# Patient Record
Sex: MALE | Race: WHITE | NOT HISPANIC OR LATINO | Employment: FULL TIME | URBAN - METROPOLITAN AREA
[De-identification: names, ages, dates, MRNs, and addresses within clinical notes are randomized per-mention and may not be internally consistent; named-entity substitution may affect disease eponyms.]

---

## 2024-07-02 ENCOUNTER — HOSPITAL ENCOUNTER (INPATIENT)
Facility: HOSPITAL | Age: 29
LOS: 5 days | Discharge: HOME | End: 2024-07-07
Attending: EMERGENCY MEDICINE | Admitting: NURSE PRACTITIONER
Payer: COMMERCIAL

## 2024-07-02 ENCOUNTER — APPOINTMENT (OUTPATIENT)
Dept: RADIOLOGY | Facility: HOSPITAL | Age: 29
DRG: 603 | End: 2024-07-02
Payer: COMMERCIAL

## 2024-07-02 DIAGNOSIS — Z93.3 CECOSTOMY STATUS (MULTI): ICD-10-CM

## 2024-07-02 DIAGNOSIS — R78.81 BACTEREMIA: ICD-10-CM

## 2024-07-02 DIAGNOSIS — L03.311 CELLULITIS, ABDOMINAL WALL: Primary | ICD-10-CM

## 2024-07-02 LAB
ALBUMIN SERPL BCP-MCNC: 5 G/DL (ref 3.4–5)
ALP SERPL-CCNC: 103 U/L (ref 33–120)
ALT SERPL W P-5'-P-CCNC: 24 U/L (ref 10–52)
ANION GAP SERPL CALC-SCNC: 16 MMOL/L (ref 10–20)
APTT PPP: 25 SECONDS (ref 27–38)
AST SERPL W P-5'-P-CCNC: 51 U/L (ref 9–39)
BASOPHILS # BLD MANUAL: 0 X10*3/UL (ref 0–0.1)
BASOPHILS NFR BLD MANUAL: 0 %
BILIRUB SERPL-MCNC: 0.9 MG/DL (ref 0–1.2)
BUN SERPL-MCNC: 8 MG/DL (ref 6–23)
CALCIUM SERPL-MCNC: 9.4 MG/DL (ref 8.6–10.6)
CHLORIDE SERPL-SCNC: 100 MMOL/L (ref 98–107)
CO2 SERPL-SCNC: 23 MMOL/L (ref 21–32)
CREAT SERPL-MCNC: 0.79 MG/DL (ref 0.5–1.3)
EGFRCR SERPLBLD CKD-EPI 2021: >90 ML/MIN/1.73M*2
EOSINOPHIL # BLD MANUAL: 0.04 X10*3/UL (ref 0–0.7)
EOSINOPHIL NFR BLD MANUAL: 0.9 %
ERYTHROCYTE [DISTWIDTH] IN BLOOD BY AUTOMATED COUNT: 12.4 % (ref 11.5–14.5)
GLUCOSE SERPL-MCNC: 73 MG/DL (ref 74–99)
HCT VFR BLD AUTO: 43.4 % (ref 41–52)
HGB BLD-MCNC: 15.4 G/DL (ref 13.5–17.5)
HOLD SPECIMEN: NORMAL
IMM GRANULOCYTES # BLD AUTO: 0.01 X10*3/UL (ref 0–0.7)
IMM GRANULOCYTES NFR BLD AUTO: 0.2 % (ref 0–0.9)
INR PPP: 1.1 (ref 0.9–1.1)
LACTATE SERPL-SCNC: 0.9 MMOL/L (ref 0.4–2)
LYMPHOCYTES # BLD MANUAL: 2.13 X10*3/UL (ref 1.2–4.8)
LYMPHOCYTES NFR BLD MANUAL: 48.3 %
MAGNESIUM SERPL-MCNC: 2.55 MG/DL (ref 1.6–2.4)
MCH RBC QN AUTO: 29.6 PG (ref 26–34)
MCHC RBC AUTO-ENTMCNC: 35.5 G/DL (ref 32–36)
MCV RBC AUTO: 83 FL (ref 80–100)
MONOCYTES # BLD MANUAL: 0.18 X10*3/UL (ref 0.1–1)
MONOCYTES NFR BLD MANUAL: 4.2 %
NEUTROPHILS # BLD MANUAL: 1.83 X10*3/UL (ref 1.2–7.7)
NEUTS BAND # BLD MANUAL: 1.12 X10*3/UL (ref 0–0.7)
NEUTS BAND NFR BLD MANUAL: 25.4 %
NEUTS SEG # BLD MANUAL: 0.71 X10*3/UL (ref 1.2–7)
NEUTS SEG NFR BLD MANUAL: 16.1 %
NRBC BLD-RTO: 0 /100 WBCS (ref 0–0)
PLASMA CELLS # BLD MANUAL: 0.04 X10*3/UL
PLASMA CELLS NFR BLD MANUAL: 0.9 %
PLATELET # BLD AUTO: 142 X10*3/UL (ref 150–450)
POTASSIUM SERPL-SCNC: 4 MMOL/L (ref 3.5–5.3)
POTASSIUM SERPL-SCNC: 5.8 MMOL/L (ref 3.5–5.3)
PROT SERPL-MCNC: 9 G/DL (ref 6.4–8.2)
PROTHROMBIN TIME: 12.9 SECONDS (ref 9.8–12.8)
RBC # BLD AUTO: 5.21 X10*6/UL (ref 4.5–5.9)
RBC MORPH BLD: ABNORMAL
SODIUM SERPL-SCNC: 133 MMOL/L (ref 136–145)
TOTAL CELLS COUNTED BLD: 118
VARIANT LYMPHS # BLD MANUAL: 0.18 X10*3/UL (ref 0–0.5)
VARIANT LYMPHS NFR BLD: 4.2 %
WBC # BLD AUTO: 4.4 X10*3/UL (ref 4.4–11.3)

## 2024-07-02 PROCEDURE — 2550000001 HC RX 255 CONTRASTS

## 2024-07-02 PROCEDURE — 87086 URINE CULTURE/COLONY COUNT: CPT | Performed by: EMERGENCY MEDICINE

## 2024-07-02 PROCEDURE — 74177 CT ABD & PELVIS W/CONTRAST: CPT

## 2024-07-02 PROCEDURE — 81001 URINALYSIS AUTO W/SCOPE: CPT | Performed by: EMERGENCY MEDICINE

## 2024-07-02 PROCEDURE — 83605 ASSAY OF LACTIC ACID: CPT

## 2024-07-02 PROCEDURE — 85007 BL SMEAR W/DIFF WBC COUNT: CPT | Performed by: EMERGENCY MEDICINE

## 2024-07-02 PROCEDURE — 87040 BLOOD CULTURE FOR BACTERIA: CPT | Performed by: EMERGENCY MEDICINE

## 2024-07-02 PROCEDURE — 85027 COMPLETE CBC AUTOMATED: CPT | Performed by: EMERGENCY MEDICINE

## 2024-07-02 PROCEDURE — 96366 THER/PROPH/DIAG IV INF ADDON: CPT

## 2024-07-02 PROCEDURE — 84132 ASSAY OF SERUM POTASSIUM: CPT | Performed by: EMERGENCY MEDICINE

## 2024-07-02 PROCEDURE — 2500000004 HC RX 250 GENERAL PHARMACY W/ HCPCS (ALT 636 FOR OP/ED): Performed by: EMERGENCY MEDICINE

## 2024-07-02 PROCEDURE — 36415 COLL VENOUS BLD VENIPUNCTURE: CPT | Performed by: EMERGENCY MEDICINE

## 2024-07-02 PROCEDURE — 96365 THER/PROPH/DIAG IV INF INIT: CPT

## 2024-07-02 PROCEDURE — 36415 COLL VENOUS BLD VENIPUNCTURE: CPT

## 2024-07-02 PROCEDURE — 96367 TX/PROPH/DG ADDL SEQ IV INF: CPT

## 2024-07-02 PROCEDURE — 99285 EMERGENCY DEPT VISIT HI MDM: CPT | Performed by: EMERGENCY MEDICINE

## 2024-07-02 PROCEDURE — 2500000004 HC RX 250 GENERAL PHARMACY W/ HCPCS (ALT 636 FOR OP/ED)

## 2024-07-02 PROCEDURE — 1210000001 HC SEMI-PRIVATE ROOM DAILY

## 2024-07-02 PROCEDURE — 85610 PROTHROMBIN TIME: CPT | Performed by: EMERGENCY MEDICINE

## 2024-07-02 PROCEDURE — 74177 CT ABD & PELVIS W/CONTRAST: CPT | Performed by: SURGERY

## 2024-07-02 PROCEDURE — 80053 COMPREHEN METABOLIC PANEL: CPT | Performed by: EMERGENCY MEDICINE

## 2024-07-02 PROCEDURE — 83735 ASSAY OF MAGNESIUM: CPT | Performed by: EMERGENCY MEDICINE

## 2024-07-02 PROCEDURE — 99285 EMERGENCY DEPT VISIT HI MDM: CPT | Mod: 25

## 2024-07-02 PROCEDURE — 99222 1ST HOSP IP/OBS MODERATE 55: CPT | Performed by: NURSE PRACTITIONER

## 2024-07-02 RX ORDER — VANCOMYCIN HYDROCHLORIDE 1 G/20ML
INJECTION, POWDER, LYOPHILIZED, FOR SOLUTION INTRAVENOUS DAILY PRN
Status: DISCONTINUED | OUTPATIENT
Start: 2024-07-02 | End: 2024-07-04

## 2024-07-02 RX ORDER — METHYLPHENIDATE HYDROCHLORIDE 36 MG/1
72 TABLET ORAL EVERY MORNING
COMMUNITY
Start: 2024-06-28

## 2024-07-02 RX ORDER — MORPHINE SULFATE 4 MG/ML
4 INJECTION INTRAVENOUS ONCE
Status: DISCONTINUED | OUTPATIENT
Start: 2024-07-02 | End: 2024-07-03

## 2024-07-02 RX ORDER — ACETAMINOPHEN 160 MG/5ML
650 SOLUTION ORAL EVERY 4 HOURS PRN
Status: DISCONTINUED | OUTPATIENT
Start: 2024-07-02 | End: 2024-07-07 | Stop reason: HOSPADM

## 2024-07-02 RX ORDER — POLYETHYLENE GLYCOL 3350 17 G/17G
17 POWDER, FOR SOLUTION ORAL DAILY PRN
Status: DISCONTINUED | OUTPATIENT
Start: 2024-07-02 | End: 2024-07-07 | Stop reason: HOSPADM

## 2024-07-02 RX ORDER — SERTRALINE HYDROCHLORIDE 100 MG/1
2 TABLET, FILM COATED ORAL
COMMUNITY
Start: 2024-06-19

## 2024-07-02 RX ORDER — SERTRALINE HYDROCHLORIDE 100 MG/1
200 TABLET, FILM COATED ORAL
Status: DISCONTINUED | OUTPATIENT
Start: 2024-07-03 | End: 2024-07-07 | Stop reason: HOSPADM

## 2024-07-02 RX ORDER — ACETAMINOPHEN 325 MG/1
650 TABLET ORAL EVERY 4 HOURS PRN
Status: DISCONTINUED | OUTPATIENT
Start: 2024-07-02 | End: 2024-07-07 | Stop reason: HOSPADM

## 2024-07-02 RX ORDER — METHYLPHENIDATE HYDROCHLORIDE 36 MG/1
72 TABLET ORAL EVERY MORNING
Status: DISCONTINUED | OUTPATIENT
Start: 2024-07-03 | End: 2024-07-03

## 2024-07-02 RX ORDER — ACETAMINOPHEN 650 MG/1
650 SUPPOSITORY RECTAL EVERY 4 HOURS PRN
Status: DISCONTINUED | OUTPATIENT
Start: 2024-07-02 | End: 2024-07-07 | Stop reason: HOSPADM

## 2024-07-02 RX ORDER — VANCOMYCIN HYDROCHLORIDE 1 G/200ML
1000 INJECTION, SOLUTION INTRAVENOUS ONCE
Status: COMPLETED | OUTPATIENT
Start: 2024-07-02 | End: 2024-07-02

## 2024-07-02 RX ORDER — LURASIDONE HYDROCHLORIDE 40 MG/1
40 TABLET, FILM COATED ORAL EVERY EVENING
Status: DISCONTINUED | OUTPATIENT
Start: 2024-07-03 | End: 2024-07-07 | Stop reason: HOSPADM

## 2024-07-02 RX ORDER — ACETAMINOPHEN 325 MG/1
650 TABLET ORAL ONCE
Status: COMPLETED | OUTPATIENT
Start: 2024-07-02 | End: 2024-07-02

## 2024-07-02 RX ORDER — LURASIDONE HYDROCHLORIDE 40 MG/1
40 TABLET, FILM COATED ORAL EVERY EVENING
COMMUNITY
Start: 2024-04-30

## 2024-07-02 RX ORDER — ONDANSETRON HYDROCHLORIDE 2 MG/ML
4 INJECTION, SOLUTION INTRAVENOUS ONCE
Status: DISCONTINUED | OUTPATIENT
Start: 2024-07-02 | End: 2024-07-07 | Stop reason: HOSPADM

## 2024-07-02 RX ORDER — LAMOTRIGINE 100 MG/1
100 TABLET ORAL DAILY
Status: DISCONTINUED | OUTPATIENT
Start: 2024-07-03 | End: 2024-07-03

## 2024-07-02 RX ORDER — ACETAMINOPHEN 500 MG
10 TABLET ORAL NIGHTLY PRN
Status: DISCONTINUED | OUTPATIENT
Start: 2024-07-02 | End: 2024-07-07 | Stop reason: HOSPADM

## 2024-07-02 RX ORDER — LAMOTRIGINE 100 MG/1
50 TABLET ORAL DAILY
COMMUNITY
Start: 2024-04-15

## 2024-07-02 RX ADMIN — VANCOMYCIN HYDROCHLORIDE 1000 MG: 1 INJECTION, SOLUTION INTRAVENOUS at 21:07

## 2024-07-02 RX ADMIN — SODIUM CHLORIDE, POTASSIUM CHLORIDE, SODIUM LACTATE AND CALCIUM CHLORIDE 1000 ML: 600; 310; 30; 20 INJECTION, SOLUTION INTRAVENOUS at 20:29

## 2024-07-02 RX ADMIN — IOHEXOL 80 ML: 350 INJECTION, SOLUTION INTRAVENOUS at 20:21

## 2024-07-02 RX ADMIN — PIPERACILLIN SODIUM AND TAZOBACTAM SODIUM 4.5 G: 4; .5 INJECTION, SOLUTION INTRAVENOUS at 20:10

## 2024-07-02 RX ADMIN — VANCOMYCIN HYDROCHLORIDE 1000 MG: 1 INJECTION, SOLUTION INTRAVENOUS at 21:08

## 2024-07-02 RX ADMIN — ACETAMINOPHEN 650 MG: 325 TABLET ORAL at 22:10

## 2024-07-02 ASSESSMENT — ENCOUNTER SYMPTOMS
FEVER: 1
PALPITATIONS: 0
MYALGIAS: 1
DYSURIA: 0
DIARRHEA: 0
HEMATURIA: 0
CHILLS: 1
SHORTNESS OF BREATH: 0
FLANK PAIN: 0
FREQUENCY: 0
VOMITING: 0
CONSTIPATION: 0
NAUSEA: 0
WOUND: 1
ABDOMINAL PAIN: 0

## 2024-07-02 ASSESSMENT — LIFESTYLE VARIABLES
EVER HAD A DRINK FIRST THING IN THE MORNING TO STEADY YOUR NERVES TO GET RID OF A HANGOVER: NO
EVER FELT BAD OR GUILTY ABOUT YOUR DRINKING: NO
TOTAL SCORE: 0
HAVE YOU EVER FELT YOU SHOULD CUT DOWN ON YOUR DRINKING: NO
HAVE PEOPLE ANNOYED YOU BY CRITICIZING YOUR DRINKING: NO

## 2024-07-02 ASSESSMENT — COLUMBIA-SUICIDE SEVERITY RATING SCALE - C-SSRS
2. HAVE YOU ACTUALLY HAD ANY THOUGHTS OF KILLING YOURSELF?: NO
1. IN THE PAST MONTH, HAVE YOU WISHED YOU WERE DEAD OR WISHED YOU COULD GO TO SLEEP AND NOT WAKE UP?: NO
6. HAVE YOU EVER DONE ANYTHING, STARTED TO DO ANYTHING, OR PREPARED TO DO ANYTHING TO END YOUR LIFE?: NO

## 2024-07-02 ASSESSMENT — PAIN - FUNCTIONAL ASSESSMENT: PAIN_FUNCTIONAL_ASSESSMENT: 0-10

## 2024-07-02 ASSESSMENT — PAIN SCALES - GENERAL
PAINLEVEL_OUTOF10: 5 - MODERATE PAIN
PAINLEVEL_OUTOF10: 4

## 2024-07-02 NOTE — ED TRIAGE NOTES
Patient states that he was born with spina bifida and has neurogenic bowel and bladder. Patient has a Chait cecostomy drain placed. Patient recently had an infection / abscess that he got drained. Patient was on oral clindamycin and finished coarse of antibiotics last week. Patient is now having body aches/ fever 102f at home / headache/ discharge from site. Concerned about a deeper more chronic infection. Patient had outpatient ultrasound. Has drained about 15ml of pus.  Patient is back on oral clindamycin

## 2024-07-02 NOTE — ED PROVIDER NOTES
HPI   Chief Complaint   Patient presents with    Wound Check     HPI    Pt is 28 y/o male with male with history of spina bifida posttreatment chait cecostomy drainage for the neurogenic bowel, neurogenic bladder, status post gastric sleeve surgery in 2018, s/p meningocele repair.  Patient reports that his Chait cecostomy was drained with approximately 15 ml of drainage in early June by his father given the patient's father is a trauma surgeon and this was done in his father's clinic.  Patient reports that he was placed on a course of oral clindamycin and finished his course about a week ago.  However patient has continued to feel generalized body aches, chills, subjective fever of 102, headache and increased drainage around his cecostomy site.  Patient states that he recently started another course of clindamycin and today is his second day.  Patient states after medication he feels much better.  Patient is currently not complaining of any dizziness, vision changes, neck pain, neck stiffness, chest pain, shortness of breath, nausea, vomiting, abdominal pain, or numbness. No sick contacts.                     Theo Coma Scale Score: 15                     Patient History   No past medical history on file.  No past surgical history on file.  No family history on file.  Social History     Tobacco Use    Smoking status: Not on file    Smokeless tobacco: Not on file   Substance Use Topics    Alcohol use: Not on file    Drug use: Not on file       Physical Exam   ED Triage Vitals [07/02/24 1833]   Temperature Heart Rate Respirations BP   35.7 °C (96.3 °F) (!) 104 16 151/88      Pulse Ox Temp src Heart Rate Source Patient Position   99 % -- -- --      BP Location FiO2 (%)     -- --       Physical Exam  Constitutional:       General: He is not in acute distress.     Appearance: He is obese.   HENT:      Head: Normocephalic and atraumatic.      Right Ear: External ear normal.      Left Ear: External ear normal.      Nose:  Nose normal.      Mouth/Throat:      Mouth: Mucous membranes are moist.      Pharynx: Oropharynx is clear.   Eyes:      General: No scleral icterus.     Extraocular Movements: Extraocular movements intact.      Conjunctiva/sclera: Conjunctivae normal.      Pupils: Pupils are equal, round, and reactive to light.   Cardiovascular:      Rate and Rhythm: Normal rate and regular rhythm.   Pulmonary:      Effort: Pulmonary effort is normal. No respiratory distress.      Breath sounds: Normal breath sounds.   Abdominal:      General: There is no distension.      Palpations: Abdomen is soft.      Tenderness: There is no guarding or rebound.      Comments: Chait cecostomy drainage site slightly erythematous, some yellow drainage noted around the area, slight tenderness palpation of the site, no induration, no fluctuance felt. No tenderness in other areas of the abdomen, no rebound tenderness, no guarding   Musculoskeletal:         General: No swelling or deformity. Normal range of motion.      Cervical back: Normal range of motion and neck supple. No rigidity.   Skin:     General: Skin is warm.      Capillary Refill: Capillary refill takes less than 2 seconds.      Coloration: Skin is not jaundiced.      Findings: No bruising, erythema or rash.   Neurological:      General: No focal deficit present.      Mental Status: He is alert and oriented to person, place, and time. Mental status is at baseline.   Psychiatric:         Mood and Affect: Mood normal.         Behavior: Behavior normal.         Thought Content: Thought content normal.         Judgment: Judgment normal.         ED Course & MDM   Diagnoses as of 07/03/24 0111   Cellulitis, abdominal wall       Medical Decision Making  Pt is 28 y/o male with male with history of spina bifida posttreatment chait cecostomy drainage for the neurogenic bowel, neurogenic bladder, status post gastric sleeve surgery in 2018, s/p meningocele repair.  Differential diagnosis is broad and  includes but not limited to cellulitis, abscess, infection, urinary tract infection, necrotizing fasciitis, hematoma, intra-abdominal process, bacteremia, or contact dermatitis.  Given the patient's recurrent history of cellulitis, this is at the top of differential.  However would like to rule out deeper soft tissue infections. In the Emergency Department, hospital records were reviewed and IV access was obtained. The patient is afebrile with stable vital signs. The patient is in no respiratory distress, satting well on room air. CMP shows a hyperkalemia of 5.8 and magnesium of 2.55 however hemolyzed. Repeat potassium was ordered and was 4.0. Lactate is normal. CBC showed no leukocytosis. Urinalysis was ordered and pending at time of sign-out to oncoming ED team.  Given concern for cellulitis despite course of outpatient antibiotics, after blood cultures x 2 were obtained, the patient was given IV vancomycin and Zosyn empirically for antibiotic coverage given his recurrent infections.  A liter of LR was given with Tylenol.     CT abdomen pelvis shows:     Right lower quadrant cecostomy site, demonstrating skin thickening  and somewhat prominent soft tissue thickening along the catheter  tract in the subcutaneous fat, suggesting inflammation/possible  infection. More inferiorly there is an apparent incomplete fistulous  communication of a prior cecostomy tract from the cecum to the right lower quadrant subcutaneous fat, with more superficial tubular soft tissue density extending to the skin surface suggesting scarring. There is no significant inflammatory change, subcutaneous air or fluid collection in the abdominal wall soft tissues.      Urinary bladder is incompletely distended and not well evaluated.  Urinary bladder wall thickening may relate to detrusor hypertrophy  from neurogenic bladder or cystitis. Correlate clinically with the  need for urinalysis to exclude infection.      Trace pelvic ascites, probably  secondary to ventriculoperitoneal  shunting. No pneumoperitoneum or loculated intraperitoneal collection. Splenomegaly.      A ventriculoperitoneal shunt catheter is noted which demonstrates  prominent focal calcification along its course in the right lateral  chest wall along the 9th rib and intercostal space (series 203,  images [id number]), limiting evaluation of continuity of the catheter in  this region; there is no adjacent fluid in this region, however.    Patient was informed of the results. He remains stable. Patient has been admitted to general medicine service for continued IV antibiotics and further management and monitoring given his refractory cellulitis.    Procedure  Procedures     Angelika Soriano MD  Resident  07/03/24 6032      I saw and evaluated the patient. I personally obtained the key and critical portions of the history and physical exam or was physically present for key and critical portions performed by the resident/fellow. I reviewed the resident/fellow's documentation and discussed the patient with the resident/fellow. I agree with the resident/fellow's medical decision making as documented in the note.    MD Sierra Lara MD  07/05/24 7651

## 2024-07-03 LAB
APPEARANCE UR: CLEAR
BACTERIA #/AREA URNS AUTO: ABNORMAL /HPF
BACTERIA UR CULT: NORMAL
BILIRUB UR STRIP.AUTO-MCNC: NEGATIVE MG/DL
COLOR UR: ABNORMAL
GLUCOSE UR STRIP.AUTO-MCNC: NORMAL MG/DL
HOLD SPECIMEN: NORMAL
KETONES UR STRIP.AUTO-MCNC: NEGATIVE MG/DL
LEUKOCYTE ESTERASE UR QL STRIP.AUTO: ABNORMAL
MUCOUS THREADS #/AREA URNS AUTO: ABNORMAL /LPF
NITRITE UR QL STRIP.AUTO: ABNORMAL
PH UR STRIP.AUTO: 6.5 [PH]
PROT UR STRIP.AUTO-MCNC: NEGATIVE MG/DL
RBC # UR STRIP.AUTO: ABNORMAL /UL
RBC #/AREA URNS AUTO: ABNORMAL /HPF
SP GR UR STRIP.AUTO: 1.02
UROBILINOGEN UR STRIP.AUTO-MCNC: NORMAL MG/DL
WBC #/AREA URNS AUTO: ABNORMAL /HPF

## 2024-07-03 PROCEDURE — 1100000001 HC PRIVATE ROOM DAILY

## 2024-07-03 PROCEDURE — 2500000004 HC RX 250 GENERAL PHARMACY W/ HCPCS (ALT 636 FOR OP/ED): Performed by: STUDENT IN AN ORGANIZED HEALTH CARE EDUCATION/TRAINING PROGRAM

## 2024-07-03 PROCEDURE — 2500000004 HC RX 250 GENERAL PHARMACY W/ HCPCS (ALT 636 FOR OP/ED): Performed by: NURSE PRACTITIONER

## 2024-07-03 PROCEDURE — 2500000002 HC RX 250 W HCPCS SELF ADMINISTERED DRUGS (ALT 637 FOR MEDICARE OP, ALT 636 FOR OP/ED): Performed by: NURSE PRACTITIONER

## 2024-07-03 PROCEDURE — 2500000001 HC RX 250 WO HCPCS SELF ADMINISTERED DRUGS (ALT 637 FOR MEDICARE OP): Performed by: NURSE PRACTITIONER

## 2024-07-03 PROCEDURE — 99233 SBSQ HOSP IP/OBS HIGH 50: CPT | Performed by: STUDENT IN AN ORGANIZED HEALTH CARE EDUCATION/TRAINING PROGRAM

## 2024-07-03 PROCEDURE — 2500000001 HC RX 250 WO HCPCS SELF ADMINISTERED DRUGS (ALT 637 FOR MEDICARE OP): Performed by: STUDENT IN AN ORGANIZED HEALTH CARE EDUCATION/TRAINING PROGRAM

## 2024-07-03 PROCEDURE — 2500000005 HC RX 250 GENERAL PHARMACY W/O HCPCS: Performed by: NURSE PRACTITIONER

## 2024-07-03 PROCEDURE — 99221 1ST HOSP IP/OBS SF/LOW 40: CPT | Performed by: SURGERY

## 2024-07-03 RX ORDER — DIAZEPAM 5 MG/1
5 TABLET ORAL EVERY 8 HOURS PRN
COMMUNITY

## 2024-07-03 RX ORDER — RIZATRIPTAN BENZOATE 10 MG/1
10 TABLET ORAL ONCE AS NEEDED
COMMUNITY

## 2024-07-03 RX ORDER — DOXYCYCLINE 100 MG/1
100 TABLET ORAL 2 TIMES DAILY
COMMUNITY
Start: 2024-06-01 | End: 2024-07-07 | Stop reason: HOSPADM

## 2024-07-03 RX ORDER — METRONIDAZOLE 500 MG/100ML
500 INJECTION, SOLUTION INTRAVENOUS EVERY 8 HOURS
Status: DISCONTINUED | OUTPATIENT
Start: 2024-07-03 | End: 2024-07-07 | Stop reason: HOSPADM

## 2024-07-03 RX ORDER — AMOXICILLIN 250 MG
1 CAPSULE ORAL NIGHTLY
Status: DISCONTINUED | OUTPATIENT
Start: 2024-07-03 | End: 2024-07-07 | Stop reason: HOSPADM

## 2024-07-03 RX ORDER — LAMOTRIGINE 25 MG/1
50 TABLET ORAL DAILY
Status: DISCONTINUED | OUTPATIENT
Start: 2024-07-04 | End: 2024-07-03

## 2024-07-03 RX ORDER — RIZATRIPTAN BENZOATE 10 MG/1
10 TABLET ORAL ONCE AS NEEDED
Status: ON HOLD | COMMUNITY
End: 2024-07-03 | Stop reason: ALTCHOICE

## 2024-07-03 RX ORDER — CEFTRIAXONE 2 G/50ML
2 INJECTION, SOLUTION INTRAVENOUS EVERY 24 HOURS
Status: DISCONTINUED | OUTPATIENT
Start: 2024-07-03 | End: 2024-07-03

## 2024-07-03 RX ORDER — CLINDAMYCIN HYDROCHLORIDE 300 MG/1
300 CAPSULE ORAL EVERY 6 HOURS
COMMUNITY
Start: 2024-07-01 | End: 2024-07-07 | Stop reason: HOSPADM

## 2024-07-03 RX ORDER — LAMOTRIGINE 25 MG/1
25 TABLET ORAL DAILY
Status: DISCONTINUED | OUTPATIENT
Start: 2024-07-03 | End: 2024-07-03

## 2024-07-03 RX ORDER — OMEPRAZOLE 40 MG/1
40 CAPSULE, DELAYED RELEASE ORAL DAILY PRN
Status: ON HOLD | COMMUNITY
End: 2024-07-03

## 2024-07-03 RX ORDER — POLYETHYLENE GLYCOL 3350 17 G/17G
17 POWDER, FOR SOLUTION ORAL NIGHTLY
Status: DISCONTINUED | OUTPATIENT
Start: 2024-07-03 | End: 2024-07-04

## 2024-07-03 RX ORDER — DIPHENHYDRAMINE HCL 25 MG
25 TABLET ORAL AS NEEDED
COMMUNITY

## 2024-07-03 RX ORDER — LAMOTRIGINE 25 MG/1
50 TABLET ORAL DAILY
Status: DISCONTINUED | OUTPATIENT
Start: 2024-07-03 | End: 2024-07-07 | Stop reason: HOSPADM

## 2024-07-03 RX ORDER — METHYLPHENIDATE HYDROCHLORIDE 36 MG/1
72 TABLET ORAL DAILY PRN
Status: DISCONTINUED | OUTPATIENT
Start: 2024-07-03 | End: 2024-07-07 | Stop reason: HOSPADM

## 2024-07-03 RX ADMIN — PIPERACILLIN SODIUM AND TAZOBACTAM SODIUM 3.38 G: 3; .375 INJECTION, SOLUTION INTRAVENOUS at 08:22

## 2024-07-03 RX ADMIN — SODIUM CHLORIDE 250 ML: 9 INJECTION, SOLUTION INTRAVENOUS at 18:59

## 2024-07-03 RX ADMIN — ACETAMINOPHEN 650 MG: 325 TABLET ORAL at 01:53

## 2024-07-03 RX ADMIN — POLYETHYLENE GLYCOL 3350 17 G: 17 POWDER, FOR SOLUTION ORAL at 18:59

## 2024-07-03 RX ADMIN — VANCOMYCIN HYDROCHLORIDE 1250 MG: 5 INJECTION, POWDER, LYOPHILIZED, FOR SOLUTION INTRAVENOUS at 23:49

## 2024-07-03 RX ADMIN — SENNOSIDES AND DOCUSATE SODIUM 1 TABLET: 50; 8.6 TABLET ORAL at 23:47

## 2024-07-03 RX ADMIN — Medication 10 MG: at 23:47

## 2024-07-03 RX ADMIN — METRONIDAZOLE 500 MG: 500 INJECTION, SOLUTION INTRAVENOUS at 18:02

## 2024-07-03 RX ADMIN — LAMOTRIGINE 50 MG: 25 TABLET ORAL at 12:44

## 2024-07-03 RX ADMIN — VANCOMYCIN HYDROCHLORIDE 1250 MG: 5 INJECTION, POWDER, LYOPHILIZED, FOR SOLUTION INTRAVENOUS at 10:02

## 2024-07-03 RX ADMIN — LURASIDONE HYDROCHLORIDE 40 MG: 40 TABLET, FILM COATED ORAL at 23:47

## 2024-07-03 RX ADMIN — CEFTRIAXONE SODIUM 2 G: 2 INJECTION, POWDER, FOR SOLUTION INTRAMUSCULAR; INTRAVENOUS at 23:53

## 2024-07-03 RX ADMIN — PIPERACILLIN SODIUM AND TAZOBACTAM SODIUM 3.38 G: 3; .375 INJECTION, SOLUTION INTRAVENOUS at 01:36

## 2024-07-03 RX ADMIN — Medication 10 MG: at 01:53

## 2024-07-03 RX ADMIN — PIPERACILLIN SODIUM AND TAZOBACTAM SODIUM 3.38 G: 3; .375 INJECTION, SOLUTION INTRAVENOUS at 13:40

## 2024-07-03 RX ADMIN — SERTRALINE HYDROCHLORIDE 200 MG: 50 TABLET ORAL at 07:04

## 2024-07-03 SDOH — SOCIAL STABILITY: SOCIAL INSECURITY: DOES ANYONE TRY TO KEEP YOU FROM HAVING/CONTACTING OTHER FRIENDS OR DOING THINGS OUTSIDE YOUR HOME?: NO

## 2024-07-03 SDOH — HEALTH STABILITY: MENTAL HEALTH: HOW OFTEN DO YOU HAVE 6 OR MORE DRINKS ON ONE OCCASION?: NEVER

## 2024-07-03 SDOH — SOCIAL STABILITY: SOCIAL INSECURITY: HAVE YOU HAD THOUGHTS OF HARMING ANYONE ELSE?: NO

## 2024-07-03 SDOH — SOCIAL STABILITY: SOCIAL INSECURITY: ARE THERE ANY APPARENT SIGNS OF INJURIES/BEHAVIORS THAT COULD BE RELATED TO ABUSE/NEGLECT?: NO

## 2024-07-03 SDOH — HEALTH STABILITY: MENTAL HEALTH: HOW OFTEN DO YOU HAVE A DRINK CONTAINING ALCOHOL?: 2-4 TIMES A MONTH

## 2024-07-03 SDOH — HEALTH STABILITY: MENTAL HEALTH: HOW MANY STANDARD DRINKS CONTAINING ALCOHOL DO YOU HAVE ON A TYPICAL DAY?: 1 OR 2

## 2024-07-03 SDOH — SOCIAL STABILITY: SOCIAL INSECURITY: ABUSE: ADULT

## 2024-07-03 SDOH — SOCIAL STABILITY: SOCIAL INSECURITY: DO YOU FEEL ANYONE HAS EXPLOITED OR TAKEN ADVANTAGE OF YOU FINANCIALLY OR OF YOUR PERSONAL PROPERTY?: NO

## 2024-07-03 SDOH — SOCIAL STABILITY: SOCIAL INSECURITY: DO YOU FEEL UNSAFE GOING BACK TO THE PLACE WHERE YOU ARE LIVING?: NO

## 2024-07-03 SDOH — SOCIAL STABILITY: SOCIAL INSECURITY: HAS ANYONE EVER THREATENED TO HURT YOUR FAMILY OR YOUR PETS?: NO

## 2024-07-03 SDOH — SOCIAL STABILITY: SOCIAL INSECURITY: ARE YOU OR HAVE YOU BEEN THREATENED OR ABUSED PHYSICALLY, EMOTIONALLY, OR SEXUALLY BY ANYONE?: NO

## 2024-07-03 SDOH — SOCIAL STABILITY: SOCIAL INSECURITY: WERE YOU ABLE TO COMPLETE ALL THE BEHAVIORAL HEALTH SCREENINGS?: YES

## 2024-07-03 SDOH — SOCIAL STABILITY: SOCIAL INSECURITY: HAVE YOU HAD ANY THOUGHTS OF HARMING ANYONE ELSE?: NO

## 2024-07-03 ASSESSMENT — COGNITIVE AND FUNCTIONAL STATUS - GENERAL
DAILY ACTIVITIY SCORE: 24
MOBILITY SCORE: 24
DAILY ACTIVITIY SCORE: 24
PATIENT BASELINE BEDBOUND: NO
MOBILITY SCORE: 24

## 2024-07-03 ASSESSMENT — ACTIVITIES OF DAILY LIVING (ADL)
TOILETING: INDEPENDENT
HEARING - RIGHT EAR: FUNCTIONAL
GROOMING: INDEPENDENT
DRESSING YOURSELF: INDEPENDENT
PATIENT'S MEMORY ADEQUATE TO SAFELY COMPLETE DAILY ACTIVITIES?: YES
WALKS IN HOME: INDEPENDENT
BATHING: INDEPENDENT
LACK_OF_TRANSPORTATION: NO
JUDGMENT_ADEQUATE_SAFELY_COMPLETE_DAILY_ACTIVITIES: YES
ADEQUATE_TO_COMPLETE_ADL: YES
FEEDING YOURSELF: INDEPENDENT
HEARING - LEFT EAR: FUNCTIONAL

## 2024-07-03 ASSESSMENT — LIFESTYLE VARIABLES
HOW OFTEN DO YOU HAVE A DRINK CONTAINING ALCOHOL: 2-4 TIMES A MONTH
AUDIT-C TOTAL SCORE: 2
AUDIT-C TOTAL SCORE: 3
HOW OFTEN DO YOU HAVE 6 OR MORE DRINKS ON ONE OCCASION: LESS THAN MONTHLY
SKIP TO QUESTIONS 9-10: 0
SKIP TO QUESTIONS 9-10: 1
HOW MANY STANDARD DRINKS CONTAINING ALCOHOL DO YOU HAVE ON A TYPICAL DAY: 1 OR 2
AUDIT-C TOTAL SCORE: 3

## 2024-07-03 ASSESSMENT — ENCOUNTER SYMPTOMS
SHORTNESS OF BREATH: 0
NAUSEA: 0
ABDOMINAL DISTENTION: 0
NEUROLOGICAL NEGATIVE: 1
VOMITING: 0
FATIGUE: 1
CHILLS: 1
FEVER: 1
ABDOMINAL PAIN: 0

## 2024-07-03 ASSESSMENT — PATIENT HEALTH QUESTIONNAIRE - PHQ9
2. FEELING DOWN, DEPRESSED OR HOPELESS: NOT AT ALL
1. LITTLE INTEREST OR PLEASURE IN DOING THINGS: NOT AT ALL
SUM OF ALL RESPONSES TO PHQ9 QUESTIONS 1 & 2: 0

## 2024-07-03 ASSESSMENT — PAIN SCALES - GENERAL
PAINLEVEL_OUTOF10: 6
PAINLEVEL_OUTOF10: 2

## 2024-07-03 ASSESSMENT — PAIN DESCRIPTION - DESCRIPTORS: DESCRIPTORS: OTHER (COMMENT)

## 2024-07-03 ASSESSMENT — PAIN - FUNCTIONAL ASSESSMENT: PAIN_FUNCTIONAL_ASSESSMENT: 0-10

## 2024-07-03 NOTE — PROGRESS NOTES
Addendum    Pharmacy Medication History Review    John Gutierrez is a 29 y.o. male admitted for Cellulitis, abdominal wall. Pharmacy reviewed the patient's [id number] medications and allergies for accuracy.    The list below reflects the updated PTA list. Comments regarding how patient may be taking medications differently can be found in the Admit Orders Activity  Prior to Admission Medications   Prescriptions Last Dose Informant Patient Reported? Taking?   clindamycin (Cleocin) 300 mg capsule  Self Yes No   Sig: Take 1 capsule (300 mg) by mouth every 6 hours.   diazePAM (Valium) 5 mg tablet  Self Yes No   Sig: Take 1 tablet (5 mg) by mouth every 8 hours if needed for anxiety.   diphenhydrAMINE (Sominex) 25 mg tablet  Self Yes No   Sig: Take 1 tablet (25 mg) by mouth if needed.   doxycycline (Adoxa) 100 mg tablet  Self Yes No   Sig: Take 1 tablet (100 mg) by mouth 2 times a day. Take with a full glass of water and do not lie down for at least 30 minutes after   lamoTRIgine (LaMICtal) 100 mg tablet  Self Yes Yes   Sig: Take 0.25 tablets (25 mg) by mouth once daily.   lurasidone (Latuda) 40 mg tablet  Self Yes Yes   Sig: Take 1 tablet (40 mg) by mouth once daily in the evening.   methylphenidate ER 36 mg extended release tablet  Self Yes Yes   Sig: Take 2 tablets (72 mg) by mouth once daily in the morning.   rizatriptan (Maxalt) 10 mg tablet  Self Yes No   Sig: Take 1 tablet (10 mg) by mouth 1 time if needed for migraine. May repeat in 2 hours if unresolved. Do not exceed 30 mg in 24 hours.   sertraline (Zoloft) 100 mg tablet  Self Yes Yes   Sig: Take 2 tablets (200 mg) by mouth early in the morning..      Facility-Administered Medications: None       The list below reflects the updated allergy list. Please review each documented allergy for additional clarification and justification.  Allergies  Reviewed by Angle Rendon RN on 7/3/2024        Severity Reactions Comments    Ciprofloxacin Not Specified  Hives     Sulfa (sulfonamide Antibiotics) Not Specified Hives     Latex Low Rash             Patient declines M2B at discharge. Pharmacy has been updated to [facility] in [place].    Sources used to complete the med history include out patient fill history, OARRS, and patient interview    Reliable historian, medication bottles at bedside    Below are additional concerns with the patient's PTA list.  Added rizatriptan 10mg, updated how patient takes lamictal on chart    Denis Feldman PharmD  Transitions of Care Pharmacist  [facility]  Please reach out via Secure Chat for questions, or if no response call [employer] or [facility]

## 2024-07-03 NOTE — CONSULTS
[facility]  ACUTE CARE SURGERY     Patient Name: John Gutierrez  MRN: [id number]  Admit Date: 702  : 1995  AGE: 29 y.o.   GENDER: male  ==============================================================================  TODAY'S ASSESSMENT AND PLAN OF CARE:  Pt is 28 y/o M with pmhx of spina bifida, neurogenic bladder, neurogenic bowel s/p Chait cecostomy at age 6 and revision in 2018, s/p sleeve gastrectomy presenting with fevers, chills, headache, and body aches. Bacteremic growing salmonella, started on vanc. UA leuk esterase +, nitrite 2+, pending cultures. ACS consulted for evaluation of cecostomy tube to assess for fistula v. infectious process at cecostomy stoma. States that 1 month ago, had fluctuant area next to cecostomy stoma. Pt father, who is surgeon, performed at home I&D and started a course of clindamycin. This area has since stopped draining. CT shows small foci of gas near cecostomy tract with possible fistula communication. Nontender near cecostomy stoma.     Recommendations  -low suspicion for intra abdominal process given exam, symptomology, and CT scan  - no further imaging needed from our standpoint  - please call with any questions     Patient seen and plans discussed with Chief Resident Dr. PATSY Nath, PGY-5 and Attending Physician Dr. Drake.    Steve Renteria MD PGY-1  Acute Care Surgery [id number]   ==============================================================================  CHIEF COMPLAINT/REASON FOR CONSULT:  Evaluation of cecostomy tube, assess fistula v. Infectious process    PAST MEDICAL HISTORY:   PMH:   - Spina bifida c/b neurogenic bowel, neurogenic bladder      PSH: Cecostomy (age 6, revised 2018), gastric sleeve 2018    FH:   No family history on file.  SOCIAL HISTORY:    Smoking:   Social History     Tobacco Use   Smoking Status Never   Smokeless Tobacco Never       Alcohol:   Social History     Substance and Sexual Activity   Alcohol Use  Defer       MEDICATIONS:   Prior to Admission medications    Medication Sig Start Date End Date Taking? Authorizing Provider   lamoTRIgine (LaMICtal) 100 mg tablet Take 0.5 tablets (50 mg) by mouth once daily. 4/15/24  Yes Historical Provider, MD   lurasidone (Latuda) 40 mg tablet Take 1 tablet (40 mg) by mouth once daily in the evening. 4/30/24  Yes Historical Provider, MD   methylphenidate ER 36 mg extended release tablet Take 2 tablets (72 mg) by mouth once daily in the morning. 6/28/24  Yes Historical Provider, MD   sertraline (Zoloft) 100 mg tablet Take 2 tablets (200 mg) by mouth early in the morning.. 6/19/24  Yes Historical Provider, MD   clindamycin (Cleocin) 300 mg capsule Take 1 capsule (300 mg) by mouth every 6 hours. 7/1/24 7/8/24  Historical Provider, MD   diazePAM (Valium) 5 mg tablet Take 1 tablet (5 mg) by mouth every 8 hours if needed for anxiety.    Historical Provider, MD   diphenhydrAMINE (Sominex) 25 mg tablet Take 1 tablet (25 mg) by mouth if needed.    Historical Provider, MD   rizatriptan (Maxalt) 10 mg tablet Take 1 tablet (10 mg) by mouth 1 time if needed for migraine. May repeat in 2 hours if unresolved. Do not exceed 30 mg in 24 hours.    Historical Provider, MD   omeprazole (PriLOSEC) 40 mg DR capsule Take 1 capsule (40 mg) by mouth once daily as needed.  7/3/24  Historical Provider, MD   rizatriptan (Maxalt) 10 mg tablet Take 1 tablet (10 mg) by mouth 1 time if needed for migraine. May repeat in 2 hours if unresolved. Do not exceed 30 mg in 24 hours.  7/3/24  Historical Provider, MD     ALLERGIES:   Allergies   Allergen Reactions    Ciprofloxacin Hives    Sulfa (Sulfonamide Antibiotics) Hives    Latex Rash       REVIEW OF SYSTEMS:  Review of Systems   Constitutional:  Positive for chills, fatigue and fever.   HENT: Negative.     Respiratory:  Negative for shortness of breath.    Cardiovascular:  Negative for chest pain.   Gastrointestinal:  Negative for abdominal distention, abdominal  pain, nausea and vomiting.   Skin: Negative.    Neurological: Negative.      PHYSICAL EXAM:  Physical Exam    Gen: NAD  Resp: Non labored breathing, chest rise equal bilaterally  Cards: non tachy  GI: abdomen soft and nondistended, nontender to palpation, Chait cecostomy tube in place with clear fluid draining in previous site of I&D  MSK: normal ROM  Skin: warm and dry  Neuro: A&Ox3    IMAGING SUMMARY:  (summary of findings, not a copy of dictation)    CT shows small foci of gas near cecostomy tract with possible fistula communication    LABS:  Results from last 7 days   Lab Units 07/02/24  1843   WBC AUTO x10*3/uL 4.4   HEMOGLOBIN g/dL 15.4   HEMATOCRIT % 43.4   PLATELETS AUTO x10*3/uL 142*   LYMPHO PCT MAN % 48.3   MONO PCT MAN % 4.2   EOSINO PCT MAN % 0.9     Results from last 7 days   Lab Units 07/02/24  1914   APTT seconds 25*   INR  1.1     Results from last 7 days   Lab Units 07/02/24 2035 07/02/24  1843   SODIUM mmol/L  --  133*   POTASSIUM mmol/L 4.0 5.8*   CHLORIDE mmol/L  --  100   CO2 mmol/L  --  23   BUN mg/dL  --  8   CREATININE mg/dL  --  0.79   CALCIUM mg/dL  --  9.4   PROTEIN TOTAL g/dL  --  9.0*   BILIRUBIN TOTAL mg/dL  --  0.9   ALK PHOS U/L  --  103   ALT U/L  --  24   AST U/L  --  51*   GLUCOSE mg/dL  --  73*     Results from last 7 days   Lab Units 07/02/24  1843   BILIRUBIN TOTAL mg/dL 0.9             I have reviewed all laboratory and imaging results ordered/pertinent for this encounter.

## 2024-07-03 NOTE — PROGRESS NOTES
Transitional Care Coordination Progress Note:  Patient discussed during interdisciplinary rounds.   Team members present: RAJAT WILLSON  Plan per Medical/Surgical team: pending ID recommendations  Payor: Bhavya  Discharge disposition: Home  Potential Barriers: none   ADOD: 2 days  Will continue to monitor for discharge planning needs.     Melissa WEEKSN, RN  Transitional Care Coordinator (TCC)  [phone]

## 2024-07-03 NOTE — PROGRESS NOTES
Pharmacy Medication History Review    John Gutierrez is a 29 y.o. male admitted for Cellulitis, abdominal wall. Pharmacy reviewed the patient's [id number] medications and allergies for accuracy.    The list below reflectives the updated PTA list. Please review each medication in order reconciliation for additional clarification and justification.  Medications Prior to Admission   Medication Sig Dispense Refill Last Dose    lamoTRIgine (LaMICtal) 100 mg tablet Take 0.5 tablets (50 mg) by mouth once daily.       lurasidone (Latuda) 40 mg tablet Take 1 tablet (40 mg) by mouth once daily in the evening.       methylphenidate ER 36 mg extended release tablet Take 2 tablets (72 mg) by mouth once daily in the morning.       sertraline (Zoloft) 100 mg tablet Take 2 tablets (200 mg) by mouth early in the morning..           The list below reflectives the updated allergy list. Please review each documented allergy for additional clarification and justification.  Allergies  Reviewed by Angle Rendon RN on 7/3/2024        Severity Reactions Comments    Ciprofloxacin Not Specified Hives     Sulfa (sulfonamide Antibiotics) Not Specified Hives     Latex Low Rash             Below are additional concerns with the patient's PTA list.  -discontinue methylphenidate order, pts do not need that while hospitalized, pt also reported to RN he uses prn.   -per pharmacy, pt fills Lamictal 100mg take ONE-HALF (50mg) daily; dose will need adjusted (currently ordered 25mg), confirmed dosage on pill bottle.     -pt has full bottles of doxycycline from 6/1 for 10 days that wasn't completed, also has a full bottle of clindamycin filled 7/1 for 7 days  -pt has multiple bottles of all his meds   -had a prn diazepam bottle in his bag, added to med list.   -did not add omeprazole 40mg because it was last filled in February and there was only an empty pill bottle in his bag    Laurence Deal, PharmD  [facility] Pharmacist

## 2024-07-03 NOTE — PROGRESS NOTES
07/03/24 1127   Discharge Planning   Living Arrangements Alone   Support Systems Parent;Family members   Assistance Needed None   Type of Residence Private residence   Who is requesting discharge planning? Patient   Home or Post Acute Services None   Patient expects to be discharged to: Home   Does the patient need discharge transport arranged? No  (Pt's sister will provide.)   Financial Resource Strain   How hard is it for you to pay for the very basics like food, housing, medical care, and heating? Not hard   Housing Stability   In the last 12 months, was there a time when you were not able to pay the mortgage or rent on time? N   In the last 12 months, was there a time when you did not have a steady place to sleep or slept in a shelter (including now)? N   Transportation Needs   In the past 12 months, has lack of transportation kept you from medical appointments or from getting medications? no   In the past 12 months, has lack of transportation kept you from meetings, work, or from getting things needed for daily living? No     Assessment Note:  Met with pt and introduced myself as care coordinator and member of the Care Transitions team for discharge planning.   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  Pt drives to [employer].  Pt's address, phone number and contact information was verified.  Pt does not have any questions/concerns at this time.     Previous Home Care: None  DME: Cecostomy supplies via mail.  Pharmacy: [facility] [place] [facility] [place].  Falls: Denies  PCP:  None    Gianna Thurston MSN, RN-BC  Transitional Care Coordinator (TCC)  [phone]

## 2024-07-03 NOTE — H&P
History Of Present Illness  John Gutierrez is a 29 y.o. male with a past medical history of spina bifida posttreatment chait cecostomy drainage for the neurogenic bowel, neurogenic bladder, status post gastric sleeve surgery in 2018, s/p meningocele repair who presented to the ED for a wound check. Apparently, the patient had a Chait cecostomy drained for purulent drainage (15 mL) recently as an outpatient and was placed on oral clindamycin. He finished the course of antibiotics last week, however today started having body aches, a subjective fever of 102, headache, and increased discharge from the site. On exam in ED44, he is joined by his sister. He reports that while an I&D was performed recently, no culture was collected that he is aware of. He denies any chest pain, shortness of breath, numbness/tingling, nausea, or vomiting.     Past Medical History  No past medical history on file.    Surgical History  No past surgical history on file.     Social History  He has no history on file for tobacco use, alcohol use, and drug use.    Family History  No family history on file.     Allergies  Ciprofloxacin, Sulfa (sulfonamide antibiotics), and Latex    Review of Systems   Constitutional:  Positive for chills and fever.   Respiratory:  Negative for shortness of breath.    Cardiovascular:  Negative for chest pain and palpitations.   Gastrointestinal:  Negative for abdominal pain, constipation, diarrhea, nausea and vomiting.   Genitourinary:  Negative for dysuria, flank pain, frequency, hematuria and urgency.   Musculoskeletal:  Positive for myalgias.   Skin:  Positive for wound.   All other systems reviewed and are negative.       Physical Exam  Vitals reviewed.   Constitutional:       Appearance: He is obese.   HENT:      Head: Normocephalic and atraumatic.   Cardiovascular:      Rate and Rhythm: Normal rate and regular rhythm.      Heart sounds: Normal heart sounds.   Pulmonary:      Effort: Pulmonary effort is  "normal.      Breath sounds: Normal air entry.   Abdominal:      General: Bowel sounds are normal.      Palpations: Abdomen is soft.      Tenderness: There is no abdominal tenderness.   Musculoskeletal:         General: No deformity.   Skin:     General: Skin is warm and dry.   Neurological:      General: No focal deficit present.      Mental Status: He is alert and oriented to person, place, and time.   Psychiatric:         Mood and Affect: Mood normal.         Behavior: Behavior normal.          Last Recorded Vitals  Blood pressure 117/72, pulse 84, temperature 35.7 °C (96.3 °F), resp. rate 16, height 1.778 m (5' 10\"), weight 120 kg (265 lb), SpO2 98%.    Relevant Results      Lab Results   Component Value Date    WBC 4.4 07/02/2024    HGB 15.4 07/02/2024    HCT 43.4 07/02/2024    MCV 83 07/02/2024     (L) 07/02/2024     Lab Results   Component Value Date    GLUCOSE 73 (L) 07/02/2024    CALCIUM 9.4 07/02/2024     (L) 07/02/2024    K 4.0 07/02/2024    CO2 23 07/02/2024     07/02/2024    BUN 8 07/02/2024    CREATININE 0.79 07/02/2024     CT abdomen pelvis w IV contrast  Narrative: Interpreted By:  Yomi Valencia and Bartolomei Aguilar Christopher   STUDY:  CT ABDOMEN PELVIS W IV CONTRAST;  7/2/2024 8:20 pm      INDICATION:  Signs/Symptoms:Chati cecostomy site erythema, fatigue.      COMPARISON:  None.      ACCESSION NUMBER(S):  [id number]      ORDERING CLINICIAN:  JOLANTA ALBRECHT      TECHNIQUE:  CT of the abdomen and pelvis was performed.  Standard contiguous  axial images were obtained at 3 mm slice thickness through the  abdomen and pelvis. Coronal and sagittal reconstructions at 3 mm  slice thickness were performed.      80 ml of contrast Omnipaque 350 were administered intravenously  without immediate complication.      FINDINGS:  LOWER CHEST:  The visualized lung base is unremarkable. The heart is normal in size  without pericardial effusion. No pleural effusion is present.  Visualized distal " esophagus appears normal.      ABDOMEN:      LIVER:  No significant abnormality.      BILE DUCTS:  No intrahepatic or extrahepatic biliary ductal dilatation.      GALLBLADDER:  Nondistended and without radiopaque stone.      PANCREAS:  The pancreas appears unremarkable.      SPLEEN:  Splenomegaly measuring 15 cm in the craniocaudal dimension.      ADRENAL GLANDS:  Bilateral adrenal glands appear normal.      KIDNEYS AND URETERS:  The kidneys are normal in size and enhance symmetrically. No  hydroureteronephrosis or nephroureterolithiasis is identified.      PELVIS:      BLADDER:  Incompletely distended and not well evaluated. Urinary bladder wall  thickening may relate to detrusor hypertrophy from neurogenic bladder  or cystitis. Correlate clinically with the need for urinalysis to  exclude infection.      REPRODUCTIVE ORGANS:  No pelvic masses.      BOWEL:  Post sleeve gastrectomy. Small bowel and large bowel are of normal  caliber without abnormal wall thickening. Within the cecum there is a  pigtail catheter, which exits through the right lower quadrant  abdominal wall. More inferiorly there is skin and subcutaneous  thickening with punctate foci of gas, likely secondary to prior  cecostomy tract and may represent a fistulous communication. There is  a partially visualized ventriculoperitoneal shunt with the distal tip  terminating in the left hemiabdomen.   The appendix is normal.      VESSELS:  Within normal limits.      PERITONEUM/RETROPERITONEUM/LYMPH NODES:  Trace pelvic ascites. No pneumoperitoneum or loculated  intraperitoneal collection.      BONES AND ABDOMINAL WALL:  No suspicious osseous lesions are identified. Degenerative discogenic  disease is noted in the lower thoracic and lumbar spine.  Right lower  quadrant cecostomy tube as described above.      Impression: Right lower quadrant cecostomy site, demonstrating skin thickening  and somewhat prominent soft tissue thickening along the catheter  tract  in the subcutaneous fat, suggesting inflammation/possible  infection. More inferiorly there is an apparent incomplete fistulous  communication of a prior cecostomy tract from the cecum to the right  lower quadrant subcutaneous fat, with more superficial tubular soft  tissue density extending to the skin surface suggesting scarring.  There is no significant inflammatory change, subcutaneous air or  fluid collection in the abdominal wall soft tissues.      Urinary bladder is incompletely distended and not well evaluated.  Urinary bladder wall thickening may relate to detrusor hypertrophy  from neurogenic bladder or cystitis. Correlate clinically with the  need for urinalysis to exclude infection.      Trace pelvic ascites, probably secondary to ventriculoperitoneal  shunting. No pneumoperitoneum or loculated intraperitoneal collection.      Splenomegaly.      A ventriculoperitoneal shunt catheter is noted which demonstrates  prominent focal calcification along its course in the right lateral  chest wall along the 9th rib and intercostal space (series 203,  images [id number]), limiting evaluation of continuity of the catheter in  this region; there is no adjacent fluid in this region, however.      Additional findings as discussed above      I personally reviewed the images/study and I agree with the findings  as stated by Otoniel Alaniz MD (Radiology Resident).  This study was interpreted at [place], [place].      MACRO:  None      Signed by: Yomi Valencia 7/2/2024 10:02 PM  Dictation workstation:   [id number]    ED Medication Administration from 07/02/2024 1820 to 07/02/2024 2255         Date/Time Order Dose Route Action Action by     07/02/2024 1950 EDT acetaminophen (Tylenol) tablet 650 mg 650 mg oral Not Given Afrates, K     07/02/2024 1955 EDT morphine injection 4 mg 4 mg intravenous Not Given Afrates, K     07/02/2024 1955 EDT ondansetron (Zofran)  injection 4 mg 4 mg intravenous Not Given Afrates, K     07/02/2024 1955 EDT vancomycin (Vancocin) 2000 mg/500 ml in D5W 500 mL IV piggyback 2,000 mg 2,000 mg intravenous Not Given Afrates, K     07/02/2024 2010 EDT piperacillin-tazobactam-dextrose (Zosyn) IV 4.5 g 4.5 g intravenous New Bag Afrates, K     07/02/2024 2021 EDT iohexol (OMNIPaque) 350 mg iodine/mL solution 80 mL 80 mL intravenous Given Whitney, G     07/02/2024 2029 EDT lactated Ringer's bolus 1,000 mL 1,000 mL intravenous New Bag Afrates, K     07/02/2024 2040 EDT piperacillin-tazobactam-dextrose (Zosyn) IV 4.5 g 0 g intravenous Stopped Afrates, K     07/02/2024 2107 EDT vancomycin (Vancocin) 1,000 mg in dextrose 5% water 200 mL 1,000 mg intravenous New Bag Afrates, K     07/02/2024 2108 EDT vancomycin (Vancocin) 1,000 mg in dextrose 5% water 200 mL 1,000 mg intravenous New Bag Afrates, K     07/02/2024 2129 EDT lactated Ringer's bolus 1,000 mL 0 mL intravenous Stopped Afrates, K     07/02/2024 2210 EDT acetaminophen (Tylenol) tablet 650 mg 650 mg oral Given Afrates, K               Assessment/Plan   Principal Problem:    Cellulitis, abdominal wall      #Cellulitis, abdominal wall  #Failure of outpatient treatment  #Spina Bifida s/p Chait cecostomy drain placement  -Afebrile on arrival, no leukocytosis  -Low suspicion for sepsis  -Completed PO clindamycin as outpatient  -Started on Vanc, Zosyn in ED, continue  -Wound culture if not done in ED  -Follow cultures, narrow as appropriate    #r/o Hyperkalemia  -Specimen marked hemolyzed  -Redraw WNL (with mild hemolysis)    #ADHD  #OCD  #Depression  #Anxiety  -Chronic  -Continue home medications           Aneudy Weathers, APRN-CNP

## 2024-07-03 NOTE — PROGRESS NOTES
Assessment/Plan   John Gutierrez is a 29 y.o. male with a past medical history of spina bifida posttreatment chait cecostomy drainage for the neurogenic bowel, neurogenic bladder, status post gastric sleeve surgery in 2018, s/p meningocele repair who presented to the ED for a wound check.  Pt had recent associated abscess drained in June s/p treatment with clinda, notes feeling body aches, fevers, chills, headache and having increased drainage from the drain site. Reports and I&D was preformed recently, no culture was collected.  Pt started clinda 2 days prior to presentation, reports he has had some improvement. On admit, afebrile, no leukocytosis. UA with positive nitrite, LE and wbcs. CT showed RLQ cecostomy site, skin thickening and soft tissue thickening along the tract, incomplete fistulous communication of a prior cecostomy tract to RLQ subcutaneous fat, more superficial tubular soft tissue density suggestive of scarring. There was urinarary bladder wall thickening which could correlate with cystitis v. Neurogenic bladder. Trace pelvic ascites noted for  shunting. Started on vanc/zosyn. ACS consutled for eval cecostomy tube and wound adjacent although no deeper process concerning at this time. Pt did grow salmonella from blood culture and Ucx is pending, still suspect GI source. Changed to vanc/ceftriaxone 2g daily, flagyl. Likely dc vanc toorrow pending further culture. Ordered TTE and CT chest with contrast to eval the aorta. Wll involve ID for further input.     #Cellulitis, abdominal wall. ?prior deeper abscess s/p drainage  #Spina Bifida s/p Chait cecostomy drain placement for neuogenic bowel  #neurogenic bladder   -Afebrile on arrival, no leukocytosis  - reported to have had drainage of purulant fluid previously, unclear if related to fistulous tract.  Now appears serous  - ACS consutled for eval cecostomy tube and wound adjacent although no deeper process concerning at this time.  -Wound culture  "ordered  Noted uti with pending ucx's.   -blood cultures growing salmonella  - will change to ceftriaxone 2g q24hrs, add flagyl for anaerobic coverage, cont vanc for now and reasees need for mrsa coverage tomorrow.   - ID consult    #r/o Hyperkalemia  -Specimen marked hemolyzed  -Redraw WNL (with mild hemolysis)     #ADHD  #OCD  #Depression  #Anxiety  -Chronic  -Continue home lamictal, lurasidone, sertraline, methylphenidate (Made prn while in the hospital)      Scheduled outpatient appointments in system:   No future appointments.  ---------------------------------------------------------------------------------------------------  Subjective   Pt feels ok, no fevers chills or other complaints. Has not had a bm since Friday did not utilize his bowel regimen which she reports using 8 ounces of water with MiraLAX antigravity followed by saline 8 ounces infusion after.  Uses it once every other day.  Need for additional dose tomorrow after assessing response.  Counseled about 250 mL, ordered  mL normal saline bacteremias for subsequent dose to be run through his cecostomy.     ---------------------------------------------------------------------------------------------------  Objective   Last Recorded Vitals  Blood pressure 111/59, pulse 63, temperature 36.5 °C (97.7 °F), resp. rate 16, height 1.778 m (5' 10\"), weight 120 kg (265 lb), SpO2 97%.  Intake/Output last 3 Shifts:  No intake/output data recorded.    Physical Exam  Vitals and nursing note reviewed.   Constitutional:       General: He is not in acute distress.     Appearance: Normal appearance. He is obese. He is not ill-appearing or toxic-appearing.   HENT:      Head: Normocephalic and atraumatic.      Mouth/Throat:      Mouth: Mucous membranes are moist.   Eyes:      General: No scleral icterus.     Extraocular Movements: Extraocular movements intact.      Conjunctiva/sclera: Conjunctivae normal.   Cardiovascular:      Rate and Rhythm: Normal rate and " regular rhythm.      Heart sounds: S1 normal and S2 normal. No murmur heard.  Pulmonary:      Effort: Pulmonary effort is normal. No respiratory distress.      Breath sounds: No wheezing, rhonchi or rales.   Abdominal:      General: Bowel sounds are normal. There is no distension.      Palpations: Abdomen is soft.      Tenderness: There is no abdominal tenderness. There is no guarding or rebound.      Comments: Cecostomy tube, surrounding skin irritation, superficial area of serous drainage adjacent to tube insertion, lower to the tube s a scar, no significant signs of superficial infection.  Nontender no rebound, overall nonacute   Musculoskeletal:         General: No swelling or deformity.      Cervical back: Neck supple.   Skin:     General: Skin is warm and dry.      Findings: No rash.   Neurological:      General: No focal deficit present.      Mental Status: He is alert. Mental status is at baseline.   Psychiatric:         Mood and Affect: Mood normal.         Relevant Results  Lab Results   Component Value Date    WBC 4.4 07/02/2024    HGB 15.4 07/02/2024    HCT 43.4 07/02/2024    MCV 83 07/02/2024     (L) 07/02/2024      Lab Results   Component Value Date    GLUCOSE 73 (L) 07/02/2024    CALCIUM 9.4 07/02/2024     (L) 07/02/2024    K 4.0 07/02/2024    CO2 23 07/02/2024     07/02/2024    BUN 8 07/02/2024    CREATININE 0.79 07/02/2024     Scheduled medications  lamoTRIgine, 100 mg, oral, Daily  lurasidone, 40 mg, oral, q PM  methylphenidate ER, 72 mg, oral, q AM  morphine, 4 mg, intravenous, Once  ondansetron, 4 mg, intravenous, Once  piperacillin-tazobactam, 3.375 g, intravenous, q6h  sertraline, 200 mg, oral, Daily  vancomycin, 1,250 mg, intravenous, q12h      Continuous medications     PRN medications  PRN medications: acetaminophen **OR** acetaminophen **OR** acetaminophen, melatonin, polyethylene glycol, vancomycin    Jose Rafael Valdez MD

## 2024-07-03 NOTE — CARE PLAN
The patient's goals for the shift include antibiotic therapy     The clinical goals for the shift include clear infection    Problem: Safety - Adult  Goal: Free from fall injury  Outcome: Progressing     Problem: Pain - Adult  Goal: Verbalizes/displays adequate comfort level or baseline comfort level  Outcome: Progressing

## 2024-07-03 NOTE — CARE PLAN
The patient's goals for the shift include Pt. will have decreased pain to RLQ.    The clinical goals for the shift include pt. will have decreased drainage and inflammation from catheter site @ RLQ.    Over the shift, the patient did not make progress toward the following goals. Barriers to progression include pt. Will have decreased abdominal pain to RLQ. Recommendations to address these barriers include  Pt. Able to get enema via cecum tube.

## 2024-07-03 NOTE — NURSING NOTE
Pt. Medications from home locked up in the medication room and will be given back to pt. Upon discharge. Pt. Has Ritalin & Valium in bag. Jp Blank RN

## 2024-07-04 ENCOUNTER — APPOINTMENT (OUTPATIENT)
Dept: RADIOLOGY | Facility: HOSPITAL | Age: 29
DRG: 603 | End: 2024-07-04
Payer: COMMERCIAL

## 2024-07-04 LAB — VANCOMYCIN SERPL-MCNC: 12.6 UG/ML (ref 5–20)

## 2024-07-04 PROCEDURE — 36415 COLL VENOUS BLD VENIPUNCTURE: CPT | Performed by: STUDENT IN AN ORGANIZED HEALTH CARE EDUCATION/TRAINING PROGRAM

## 2024-07-04 PROCEDURE — 99222 1ST HOSP IP/OBS MODERATE 55: CPT | Performed by: STUDENT IN AN ORGANIZED HEALTH CARE EDUCATION/TRAINING PROGRAM

## 2024-07-04 PROCEDURE — 71260 CT THORAX DX C+: CPT | Performed by: RADIOLOGY

## 2024-07-04 PROCEDURE — 2500000001 HC RX 250 WO HCPCS SELF ADMINISTERED DRUGS (ALT 637 FOR MEDICARE OP): Performed by: STUDENT IN AN ORGANIZED HEALTH CARE EDUCATION/TRAINING PROGRAM

## 2024-07-04 PROCEDURE — 2500000002 HC RX 250 W HCPCS SELF ADMINISTERED DRUGS (ALT 637 FOR MEDICARE OP, ALT 636 FOR OP/ED): Performed by: NURSE PRACTITIONER

## 2024-07-04 PROCEDURE — 87040 BLOOD CULTURE FOR BACTERIA: CPT | Performed by: STUDENT IN AN ORGANIZED HEALTH CARE EDUCATION/TRAINING PROGRAM

## 2024-07-04 PROCEDURE — 87070 CULTURE OTHR SPECIMN AEROBIC: CPT | Performed by: STUDENT IN AN ORGANIZED HEALTH CARE EDUCATION/TRAINING PROGRAM

## 2024-07-04 PROCEDURE — 71260 CT THORAX DX C+: CPT

## 2024-07-04 PROCEDURE — 2500000005 HC RX 250 GENERAL PHARMACY W/O HCPCS: Performed by: NURSE PRACTITIONER

## 2024-07-04 PROCEDURE — 2500000004 HC RX 250 GENERAL PHARMACY W/ HCPCS (ALT 636 FOR OP/ED): Performed by: STUDENT IN AN ORGANIZED HEALTH CARE EDUCATION/TRAINING PROGRAM

## 2024-07-04 PROCEDURE — 99233 SBSQ HOSP IP/OBS HIGH 50: CPT | Performed by: STUDENT IN AN ORGANIZED HEALTH CARE EDUCATION/TRAINING PROGRAM

## 2024-07-04 PROCEDURE — 83630 LACTOFERRIN FECAL (QUAL): CPT | Performed by: STUDENT IN AN ORGANIZED HEALTH CARE EDUCATION/TRAINING PROGRAM

## 2024-07-04 PROCEDURE — 2550000001 HC RX 255 CONTRASTS: Performed by: STUDENT IN AN ORGANIZED HEALTH CARE EDUCATION/TRAINING PROGRAM

## 2024-07-04 PROCEDURE — 1100000001 HC PRIVATE ROOM DAILY

## 2024-07-04 PROCEDURE — 2500000004 HC RX 250 GENERAL PHARMACY W/ HCPCS (ALT 636 FOR OP/ED): Performed by: NURSE PRACTITIONER

## 2024-07-04 PROCEDURE — 36415 COLL VENOUS BLD VENIPUNCTURE: CPT

## 2024-07-04 PROCEDURE — 80202 ASSAY OF VANCOMYCIN: CPT

## 2024-07-04 PROCEDURE — 2500000001 HC RX 250 WO HCPCS SELF ADMINISTERED DRUGS (ALT 637 FOR MEDICARE OP): Performed by: NURSE PRACTITIONER

## 2024-07-04 RX ORDER — POLYETHYLENE GLYCOL 3350 17 G/17G
17 POWDER, FOR SOLUTION ORAL 2 TIMES DAILY PRN
Status: DISCONTINUED | OUTPATIENT
Start: 2024-07-05 | End: 2024-07-07 | Stop reason: HOSPADM

## 2024-07-04 RX ORDER — POLYETHYLENE GLYCOL 3350 17 G/17G
17 POWDER, FOR SOLUTION ORAL ONCE
Status: COMPLETED | OUTPATIENT
Start: 2024-07-04 | End: 2024-07-04

## 2024-07-04 RX ADMIN — IOHEXOL 90 ML: 350 INJECTION, SOLUTION INTRAVENOUS at 08:49

## 2024-07-04 RX ADMIN — LAMOTRIGINE 50 MG: 25 TABLET ORAL at 08:29

## 2024-07-04 RX ADMIN — METRONIDAZOLE 500 MG: 500 INJECTION, SOLUTION INTRAVENOUS at 13:00

## 2024-07-04 RX ADMIN — POLYETHYLENE GLYCOL 3350 17 G: 17 POWDER, FOR SOLUTION ORAL at 13:59

## 2024-07-04 RX ADMIN — LURASIDONE HYDROCHLORIDE 40 MG: 40 TABLET, FILM COATED ORAL at 20:31

## 2024-07-04 RX ADMIN — SODIUM CHLORIDE 500 ML: 9 INJECTION, SOLUTION INTRAVENOUS at 14:44

## 2024-07-04 RX ADMIN — SENNOSIDES AND DOCUSATE SODIUM 1 TABLET: 50; 8.6 TABLET ORAL at 20:31

## 2024-07-04 RX ADMIN — VANCOMYCIN HYDROCHLORIDE 1250 MG: 5 INJECTION, POWDER, LYOPHILIZED, FOR SOLUTION INTRAVENOUS at 10:23

## 2024-07-04 RX ADMIN — POLYETHYLENE GLYCOL 3350 17 G: 17 POWDER, FOR SOLUTION ORAL at 20:54

## 2024-07-04 RX ADMIN — METRONIDAZOLE 500 MG: 500 INJECTION, SOLUTION INTRAVENOUS at 01:30

## 2024-07-04 RX ADMIN — CEFTRIAXONE SODIUM 2 G: 2 INJECTION, POWDER, FOR SOLUTION INTRAMUSCULAR; INTRAVENOUS at 23:00

## 2024-07-04 RX ADMIN — Medication 10 MG: at 20:31

## 2024-07-04 RX ADMIN — SODIUM CHLORIDE 250 ML: 9 INJECTION, SOLUTION INTRAVENOUS at 13:59

## 2024-07-04 RX ADMIN — METRONIDAZOLE 500 MG: 500 INJECTION, SOLUTION INTRAVENOUS at 20:31

## 2024-07-04 RX ADMIN — SERTRALINE HYDROCHLORIDE 200 MG: 50 TABLET ORAL at 08:29

## 2024-07-04 ASSESSMENT — COGNITIVE AND FUNCTIONAL STATUS - GENERAL
DAILY ACTIVITIY SCORE: 24
MOBILITY SCORE: 24

## 2024-07-04 ASSESSMENT — PAIN - FUNCTIONAL ASSESSMENT
PAIN_FUNCTIONAL_ASSESSMENT: 0-10
PAIN_FUNCTIONAL_ASSESSMENT: 0-10

## 2024-07-04 ASSESSMENT — PAIN SCALES - GENERAL
PAINLEVEL_OUTOF10: 0 - NO PAIN
PAINLEVEL_OUTOF10: 0 - NO PAIN

## 2024-07-04 NOTE — CARE PLAN
The patient's goals for the shift include pt. will be able to have CT scan completed.    The clinical goals for the shift include pt. will have  BM by 7.5.2024    Over the shift, the patient did not make progress toward the following goals. Barriers to progression include CT scan completed. Recommendations to address these barriers include pt. Had small BM, stool sent off.

## 2024-07-04 NOTE — PROGRESS NOTES
Vancomycin Dosing by Pharmacy- Cessation of Therapy    Consult to pharmacy for vancomycin dosing has been discontinued by the prescriber, pharmacy will sign off at this time.    Please call pharmacy if there are further questions or re-enter a consult if vancomycin is resumed.     Berenice Ontiveros, PharmD

## 2024-07-04 NOTE — CONSULTS
Inpatient consult to Infectious Diseases  Consult performed by: Tanya Paz MD  Consult ordered by: Jose Rafael Valdez MD      Primary MD: No primary care provider on file.  Reason For Consult: eval of salmonella bacteremia, also pending ucx, wound associated with cecostomy tube and noted to have an indwelling  shunt.     History Of Present Illness  John Gutierrez is a 29 y.o. male with past medical history of spina bifida s/p  chait-cecostomy drainage for the neurogenic bowel, neurogenic bladder, s/p gastric sleeve surgery in 2018, s/p meningocele repair with  shunt in place presented on 7/2 for wound check.    Patient had his first cecotomy for neurogenic bowel followed by gastric sleeve in 2018 after which cecostomy site was changed. He reported he noticed around 5/2024 he had redness and swelling around his cecostomy site for which he was initially on augmentin and then clindamycin for few days. He got it drained by his father around 5/29/20[facility]. He then went to [place] and the [place]. Towards the end of June/early July he noticed some redness, induration and fatigue. He was prescribed Clindamycin on 7/1 and then he noticed body aches, chills, and subjective feeling of fever along with increased drainage from cecostomy site.  He then presented to the hospital on 7/2. On presentation he was tachycardic, hypertensive and rest of the vitals were unremarkable.  On examination was found to have yellow colored drainage from cecostomy, and the area was slightly erythematous, tender but without any induration or fluctuance.  CT abdomen and pelvis demonstrated skin thickening and somewhat prominent soft tissues thickening along the catheter tract in the subcutaneous fat suggesting inflammation.  Urinary bladder wall thickening was also noted on the CT.   shunt catheter was also noted.  Urinalysis was done which showed 21-50 WBCs urine culture however was negative.  Blood cultures drawn in  the emergency grew Salmonella.  Was started on IV vancomycin and pip-tazo and was then switched from pip-tazo to ceftriaxone and metronidazole.   Patient ROS is negative for chest pain, shortness of breath, nausea, vomiting and abdominal pain.     While patient was on [place] he reported he went hiking and drank water from the lake and used someone else's filter. He then went to [place] and swam in the ocean, went for kayaking. He reported consumption of raw brownie batter but others who ate it didn't get sick. He buys everything from the store, and nothing from farmers market. Doesn't own pets.    ROS was negative for vomiting, headache, photophobia, neck pain, stiffness, altered mental status, cough, dyspnea, sick contacts,    Allergies:  Allergic to Cipro and sulfa drugs: Rash     Social history:  Born and raised in [place].   Enrolled in [employer] graduate studies at [employer].  No tobacco use.   Has a cat.      Past Medical History  He has no past medical history on file.    Surgical History  He has no past surgical history on file.     Social History     Occupational History    Not on file   Tobacco Use    Smoking status: Never    Smokeless tobacco: Never   Vaping Use    Vaping status: Never Used   Substance and Sexual Activity    Alcohol use: Defer    Drug use: Defer    Sexual activity: Defer     Travel History   Travel since 06/04/24    No documented travel since 06/04/24            Pets: yes - Cat  Hobbies: no    Family History  No family history on file.  Allergies  Ciprofloxacin, Sulfa (sulfonamide antibiotics), and Latex     Immunization History   Administered Date(s) Administered    Moderna COVID-19 vaccine, Fall 2023, 12 yeasrs and older (50mcg/0.5mL) 09/21/2023     Medications  Home medications:  Medications Prior to Admission   Medication Sig Dispense Refill Last Dose    lamoTRIgine (LaMICtal) 100 mg tablet Take 0.5 tablets (50 mg) by mouth once daily.       lurasidone (Latuda) 40 mg tablet Take 1 tablet  (40 mg) by mouth once daily in the evening.       methylphenidate ER 36 mg extended release tablet Take 2 tablets (72 mg) by mouth once daily in the morning.       sertraline (Zoloft) 100 mg tablet Take 2 tablets (200 mg) by mouth early in the morning..       clindamycin (Cleocin) 300 mg capsule Take 1 capsule (300 mg) by mouth every 6 hours.       diazePAM (Valium) 5 mg tablet Take 1 tablet (5 mg) by mouth every 8 hours if needed for anxiety.       diphenhydrAMINE (Sominex) 25 mg tablet Take 1 tablet (25 mg) by mouth if needed.       [] doxycycline (Adoxa) 100 mg tablet Take 1 tablet (100 mg) by mouth 2 times a day. Take with a full glass of water and do not lie down for at least 30 minutes after       rizatriptan (Maxalt) 10 mg tablet Take 1 tablet (10 mg) by mouth 1 time if needed for migraine. May repeat in 2 hours if unresolved. Do not exceed 30 mg in 24 hours.        Current medications:  Scheduled medications  cefTRIAXone, 2 g, intravenous, q24h  lamoTRIgine, 50 mg, oral, Daily  lurasidone, 40 mg, oral, q PM  metroNIDAZOLE, 500 mg, intravenous, q8h  ondansetron, 4 mg, intravenous, Once  polyethylene glycol, 17 g, oral, Nightly  sennosides-docusate sodium, 1 tablet, oral, Nightly  sertraline, 200 mg, oral, Daily  sodium chloride, 250 mL, extracorporeal, Nightly  vancomycin, 1,250 mg, intravenous, q12h      Continuous medications     PRN medications  PRN medications: acetaminophen **OR** acetaminophen **OR** acetaminophen, melatonin, methylphenidate ER, polyethylene glycol, vancomycin    Review of Systems  Negatuve except as HPI    Objective  Range of Vitals (last 24 hours)  Heart Rate:  [98]   Temp:  [36.6 °C (97.9 °F)]   Resp:  [20]   BP: (119)/(68)   SpO2:  [97 %]   Daily Weight  24 : 120 kg (265 lb)    Body mass index is 38.02 kg/m².     Physical Exam  General: Patient is laying in bed, in no acute distress.   HEENT: Anicteric sclera, no conjunctival pallor. No oral sores/ulcers. No dental  "caries.   CVS: S1/S2 audible, no M/G/R.  Resp: Breathing comfortably on RA. Normal vesicular breathing. No wheezing, crackles or rhonchi auscultated.   Abd: Non distended, non tender, no organomegaly was appreciated. +BS. Cecostomy seen on right side with some surrounding erythema, no drainage was noted, Non tender.  CNS: AAO x4. No gross focal deficits appreciated.  Skin: No rashes or wounds seen.       Relevant Results  Outside Hospital Results  Yes -   Labs  Results from last 72 hours   Lab Units 07/02/24  1843   WBC AUTO x10*3/uL 4.4   HEMOGLOBIN g/dL 15.4   HEMATOCRIT % 43.4   PLATELETS AUTO x10*3/uL 142*   LYMPHO PCT MAN % 48.3   MONO PCT MAN % 4.2   EOSINO PCT MAN % 0.9     Results from last 72 hours   Lab Units 07/02/24 2035 07/02/24  1843   SODIUM mmol/L  --  133*   POTASSIUM mmol/L 4.0 5.8*   CHLORIDE mmol/L  --  100   CO2 mmol/L  --  23   BUN mg/dL  --  8   CREATININE mg/dL  --  0.79   GLUCOSE mg/dL  --  73*   CALCIUM mg/dL  --  9.4   ANION GAP mmol/L  --  16   EGFR mL/min/1.73m*2  --  >90     Results from last 72 hours   Lab Units 07/02/24  1843   ALK PHOS U/L 103   BILIRUBIN TOTAL mg/dL 0.9   PROTEIN TOTAL g/dL 9.0*   ALT U/L 24   AST U/L 51*   ALBUMIN g/dL 5.0     Estimated Creatinine Clearance: 125 mL/min (by C-G formula based on SCr of 0.79 mg/dL).  No results found for: \"CRP\", \"SEDRATE\"  No results found for: \"HIV1X2\", \"HIVCONF\", \"QTBTFA3TA\"  No results found for: \"HEPCABINIT\", \"HEPCAB\", \"HCVPCRQUANT\"  Microbiology  Susceptibility data from last 90 days.  Collected Specimen Info Organism   07/02/24 Blood culture from Peripheral Venipuncture Salmonella     Imaging  CT Abd/Pelvis: 7/2:  Right lower quadrant cecostomy site, demonstrating skin thickening  and somewhat prominent soft tissue thickening along the catheter  tract in the subcutaneous fat, suggesting inflammation/possible  infection. More inferiorly there is an apparent incomplete fistulous  communication of a prior cecostomy tract from the " cecum to the right  lower quadrant subcutaneous fat, with more superficial tubular soft  tissue density extending to the skin surface suggesting scarring.  There is no significant inflammatory change, subcutaneous air or  fluid collection in the abdominal wall soft tissues.     Urinary bladder is incompletely distended and not well evaluated.  Urinary bladder wall thickening may relate to detrusor hypertrophy  from neurogenic bladder or cystitis. Correlate clinically with the  need for urinalysis to exclude infection.     Trace pelvic ascites, probably secondary to ventriculoperitoneal  shunting. No pneumoperitoneum or loculated intraperitoneal collection.     Splenomegaly.     A ventriculoperitoneal shunt catheter is noted which demonstrates  prominent focal calcification along its course in the right lateral  chest wall along the 9th rib and intercostal space (series 203,  images [id number]), limiting evaluation of continuity of the catheter in  this region; there is no adjacent fluid in this region, however.     Additional findings as discussed above     CT Chest with IV Contrast: 7/3:  1. No evidence of acute pathology. No evidence of aortic pathology.  2. Incidental Finding:  A non-calcified pulmonary nodule/multiple  non-calcified pulmonary nodules measuring less than 6 mm, likely  benign.     Micro:  7/2: Blood cultures: Salmonella 1/4 bottles  7/2: Urine culture: Negative  7/4: Tissue/wound culture: In process    Antibiotics:  IV ceftriaxone: 7/3 - p  IV metronidazole: 7/3 - p  --------------------------------  IV pip-tazo: 7/2 - 7/3  IV vancomycin: 7/2 - 7/3    Assessment/Plan   29 y.o. male with past medical history of spina bifida posttreatment with chait-cecostomy drainage for the neurogenic bowel, neurogenic bladder, s/p gastric sleeve surgery in 2018, s/p meningocele repair presented on 7/2 for increased drainage from chait cecostomy site. On presentation was found to have yellow colored drainage from  cecostomy, and the area was slightly erythematous, tender but without any induration or fluctuance.  CT abdomen and pelvis demonstrated skin thickening and somewhat prominent soft tissues thickening along the catheter tract in the subcutaneous fat suggesting inflammation. Urinalysis showed asymp pyuria.  Blood cultures drawn in the emergency grew Salmonella.      Cecostomy site superficial infection:  Patient is s/p course with clindamycin and Augmentin, and reports a prior abscess which was drained. Noted to have a fistulous communication from prior cecostomy tract. Evaluated by surgery for the fistulous tract around the cecostomy tube and given no acute intra-abdominal process was not recommended any intervention. Abdomen is stable as of now, but if he continues to have symptoms the cecostomy area might need further imaging such as fistulogram.     Salmonella Bacteremia:  Possible exposures are raw brownie batter? But he used store bought eggs. He also drank from river water but it was filtered. Salmonella has been reported in rain water in the past. Patient is allergic to Ciprofloxacin which would have been a reasonable regimen for Salmonella. Patient denied any active GI symptoms currently; had a HA prior to admission that has improved, but overall lower suspicion for meningitis/ shunt infection based on symptomatology and presentation otherwise.    Clinical Impression: Salmonella Bacteremia, Cecostomy site infection    Recommendations:  Continue IV Ceftriaxone 2G Q24H and PO flagyl 500 TID  Will follow up on sensitivities as resistance is not uncommon with Salmonella, though his clinical improvement is reassuring.  Will follow up on repeat blood cultures and stool testing.  Will follow up on TTE.  Anticipating 2 weeks of the above regimen assuming adequate clearance of cultures and reassuring TTE  Discussed chance of cecocutaneous fistula with Mr. Gutierrez and his family. Reasonable to treat the above issues  then determine if a new fistulous connection might be arising/responsible for the recurrent ST infections in that area he has been experiencing    Plan was discussed with ID attending Dr Castillo  We will continue to follow the patient.     Tanya Paz MD  PGY5, ID Fellow.   Team B Pager: [phone]  For new consults, contact pager [phone].   EPIC chat preferred.    Tanya Paz MD    I saw and evaluated the patient. I personally obtained the key and critical portions of the history and physical exam or was physically present for key and critical portions performed by the resident/fellow. I reviewed the resident/fellow's documentation and discussed the patient with the resident/fellow. I agree with the resident/fellow's medical decision making as documented in the note.    Josemanuel Castillo MD

## 2024-07-04 NOTE — NURSING NOTE
Dr. Marina Gutierrez called stated she is the patient PCP for patient. She can be reached at [phone]. She stated patient need new flush orders ASAP. Flush order is as follows: 17g of miralax mixed with 8oz to 12oz of water. Let it infuse and once it is done, 20 minutes later hang 1L of normal saline. She stated that will help patient have bowel movement and flush out the infection. Information relayed to nighttime covering provider.      2204: Night time covering provider spoke with patient and sister at bedside. Writer and charge RN were present as well. Sister provided parents telephone numbers.     Merrick Gutierrez (Father) [phone].    Marina Gutierrez (Mother) [phone].    Both patient and sister were informed attending physician will contact patient's father/surgeon tomorrow morning.

## 2024-07-04 NOTE — PROGRESS NOTES
Assessment/Plan   John Gutierrez is a 29 y.o. male with a past medical history of spina bifida posttreatment chait cecostomy drainage for the neurogenic bowel, neurogenic bladder, status post gastric sleeve surgery in 2018, s/p meningocele repair who presented to the ED for a wound check.  Pt had recent associated abscess drained in June s/p treatment with clinda, notes feeling body aches, fevers, chills, headache and having increased drainage from the drain site. Reports and I&D was preformed recently, no culture was collected.  Pt started clinda 2 days prior to presentation, reports he has had some improvement. On admit, afebrile, no leukocytosis. UA with positive nitrite, LE and wbcs. CT showed RLQ cecostomy site, skin thickening and soft tissue thickening along the tract, incomplete fistulous communication of a prior cecostomy tract to RLQ subcutaneous fat, more superficial tubular soft tissue density suggestive of scarring. There was urinarary bladder wall thickening which could correlate with cystitis v. Neurogenic bladder. Trace pelvic ascites noted for  shunting. Started on vanc/zosyn. ACS consutled for eval cecostomy tube and wound adjacent although no deeper process concerning at this time. Pt did grow salmonella from blood culture and Ucx is pending, still suspect GI source. Changed to vanc/ceftriaxone 2g daily, flagyl. Likely dc vanc toorrow pending further culture. Ordered TTE and CT chest with contrast to eval the aorta. Wll involve ID for further input.     #Salmonella bacteremia  #Cellulitis, abdominal wall. ?prior deeper abscess s/p drainage  #Spina Bifida s/p Chait cecostomy drain placement for neuogenic bowel  #neurogenic bladder   -Afebrile on arrival, no leukocytosis  - pt reported prior superfical abscess drainage and no cultures taken, stated it was about 1 cm below the surface of the skin.   - reported to have had drainage of purulant fluid previously, unclear if related to fistulous tract.  " Now appears serous  - ACS consutled for eval cecostomy tube and wound adjacent although no deeper process concerning at this time. No intervention, altough need to discuss with ID relavence of devices in setting of salmonella.   -Wound culture ordered  Noted uti on UA but culture showed no growth  -blood cultures growing salmonella  - will change to ceftriaxone 2g q24hrs, add flagyl for anaerobic coverage, cont vanc for now and reasees need for mrsa coverage tomorrow.   - ID consult  - clarifying bowel regimen: pt report 1 cup mixed with miralax followed by saline \"states mixes salt in 1 cup of water\". Later her mom called stated she is the pcp for the patient and the she tried to place orders through the nurse verbally. Per discussion with patient and sister these directions were different from what they reported. Will need clarification today.   - after clarification: pt takes about 1 cup/1cup regimen but will use 3-4 cups of salt water if not had a bm for some time. Requested stool softener and enema which were ordered. Bowel regimen through cecostomy made BID and if pt is symptomatic will check kub to further assess.   - at this time Unclear source of infection but patient reports drinking stream water while hiking with an unknown filter , also unclear if using contaminated water for his but bowel regimen or foodborne.  Unclear significance to his  shunt and cath at this time, discussing with ID.  Unclear significance of mild splenomegaly.  At this time leaning toward 2 weeks of IV ceftriaxone with midline if repeat cultures are negative and monitoring for recurrence.  Pending echocardiogram tomorrow.  Pending stool workup      #r/o Hyperkalemia  -Specimen marked hemolyzed  -Redraw WNL (with mild hemolysis)     #ADHD  #OCD  #Depression  #Anxiety  -Chronic  -Continue home lamictal, lurasidone, sertraline, methylphenidate (Made prn while in the hospital)      Patient would like to have a primary care establish " "in [place] although his still followed by primary care in [place] (family), has good family support, currently in grad school.  May need home care services for discharge purposes.    Scheduled outpatient appointments in system:   No future appointments.  ---------------------------------------------------------------------------------------------------  Subjective   No new events, primary concern is having a bowel movement since last bowel movement was Friday.  No abdominal pain.  We discussed the bowel regimen and orders placed.  Okay with an enema as well however would prefer to saline water enema versus tap if possible.  No fevers or chills.  Patient reported that 2 days after his I&D he went on a trip to walk in [place] he did drink river water through an unknown filter.  He is also in [place] where he may have other exposures.  May also be foodborne.  Noting there is some leakage around his cystostomy tube which appears to be stool colored after flushing.     ---------------------------------------------------------------------------------------------------  Objective   Last Recorded Vitals  Blood pressure 119/68, pulse 98, temperature 36.6 °C (97.9 °F), resp. rate 20, height 1.778 m (5' 10\"), weight 120 kg (265 lb), SpO2 97%.  Intake/Output last 3 Shifts:  I/O last 3 completed shifts:  In: 400 (3.3 mL/kg) [IV Piggyback:400]  Out: - (0 mL/kg)   Weight: 120.2 kg     Physical Exam  Vitals and nursing note reviewed.   Constitutional:       General: He is not in acute distress.     Appearance: Normal appearance. He is obese. He is not ill-appearing or toxic-appearing.   HENT:      Head: Normocephalic and atraumatic.      Mouth/Throat:      Mouth: Mucous membranes are moist.   Eyes:      General: No scleral icterus.     Extraocular Movements: Extraocular movements intact.      Conjunctiva/sclera: Conjunctivae normal.   Cardiovascular:      Rate and Rhythm: Normal rate and regular rhythm.      Heart sounds: " S1 normal and S2 normal. No murmur heard.  Pulmonary:      Effort: Pulmonary effort is normal. No respiratory distress.      Breath sounds: No wheezing, rhonchi or rales.   Abdominal:      General: Bowel sounds are normal. There is no distension.      Palpations: Abdomen is soft.      Tenderness: There is no abdominal tenderness. There is no guarding or rebound.      Comments: Cecostomy tube, surrounding skin irritation, superficial area of serous drainage adjacent to tube insertion, lower to the tube s a scar, no significant signs of superficial infection.  Nontender no rebound, overall nonacute   Musculoskeletal:         General: No swelling or deformity.      Cervical back: Neck supple.   Skin:     General: Skin is warm and dry.      Findings: No rash.   Neurological:      General: No focal deficit present.      Mental Status: He is alert. Mental status is at baseline.   Psychiatric:         Mood and Affect: Mood normal.         Relevant Results  Lab Results   Component Value Date    WBC 4.4 07/02/2024    HGB 15.4 07/02/2024    HCT 43.4 07/02/2024    MCV 83 07/02/2024     (L) 07/02/2024      Lab Results   Component Value Date    GLUCOSE 73 (L) 07/02/2024    CALCIUM 9.4 07/02/2024     (L) 07/02/2024    K 4.0 07/02/2024    CO2 23 07/02/2024     07/02/2024    BUN 8 07/02/2024    CREATININE 0.79 07/02/2024     Scheduled medications  cefTRIAXone, 2 g, intravenous, q24h  lamoTRIgine, 50 mg, oral, Daily  lurasidone, 40 mg, oral, q PM  metroNIDAZOLE, 500 mg, intravenous, q8h  ondansetron, 4 mg, intravenous, Once  polyethylene glycol, 17 g, oral, Nightly  sennosides-docusate sodium, 1 tablet, oral, Nightly  sertraline, 200 mg, oral, Daily  sodium chloride, 250 mL, extracorporeal, Nightly  vancomycin, 1,250 mg, intravenous, q12h      Continuous medications     PRN medications  PRN medications: acetaminophen **OR** acetaminophen **OR** acetaminophen, melatonin, methylphenidate ER, polyethylene glycol,  vancomycin    Jose Rafael Valdez MD

## 2024-07-04 NOTE — PROGRESS NOTES
Vancomycin Dosing by Pharmacy- FOLLOW UP    John Gutierrez is a 29 y.o. year old male who Pharmacy has been consulted for vancomycin dosing for Abdominal Infection. Based on the patient's indication and renal status this patient is being dosed based on a goal AUC of 400-600.     Renal function is currently stable.    Current vancomycin dose: 1250 mg given every 12 hours    Estimated vancomycin AUC on current dose: 399 mg/L.hr     Visit Vitals  /68   Pulse 98   Temp 36.6 °C (97.9 °F)   Resp 20        Lab Results   Component Value Date    CREATININE 0.79 2024        Patient weight is as follows:   Vitals:    24 1833   Weight: 120 kg (265 lb)       Cultures:  Susceptibility data for the encounter in last 14 days.  Collected Specimen Info Organism   24 Blood culture from Peripheral Venipuncture Salmonella        I/O last 3 completed shifts:  In: 400 (3.3 mL/kg) [IV Piggyback:400]  Out: - (0 mL/kg)   Weight: 120.2 kg   I/O during current shift:  No intake/output data recorded.    Temp (24hrs), Av.6 °C (97.9 °F), Min:36.6 °C (97.9 °F), Max:36.6 °C (97.9 °F)      Assessment/Plan    Below goal AUC. Orders placed for new vancomcyin regimen of 1500 every 12 hours to begin at 1200.     This dosing regimen is predicted by [employer] to result in the following pharmacokinetic parameters:  Loading dose: N/A  Regimen: 1500 mg IV every 12 hours.  Start time: 11:49 on 2024  Exposure target: AUC24 (range)400-600 mg/L.hr   AUC24,ss: 476 mg/L.hr  Probability of AUC24 > 400: 78 %  Ctrough,ss: 15.5 mg/L  Probability of Ctrough,ss > 20: 24 %  Probability of nephrotoxicity (Lodise LILIANA ): 11 %    The next level will be obtained on  at AM labs. May be obtained sooner if clinically indicated.   Will continue to monitor renal function daily while on vancomycin and order serum creatinine at least every 48 hours if not already ordered.  Follow for continued vancomycin needs, clinical response, and  signs/symptoms of toxicity.       Berenice Ontiveros, PharmD

## 2024-07-05 ENCOUNTER — APPOINTMENT (OUTPATIENT)
Dept: CARDIOLOGY | Facility: HOSPITAL | Age: 29
End: 2024-07-05
Payer: COMMERCIAL

## 2024-07-05 LAB
AORTIC VALVE PEAK VELOCITY: 1.34 M/S
AV PEAK GRADIENT: 7.2 MMHG
AVA (PEAK VEL): 4.05 CM2
EJECTION FRACTION APICAL 4 CHAMBER: 66.5
EJECTION FRACTION: 66 %
LEFT ATRIUM VOLUME AREA LENGTH INDEX BSA: 29.5 ML/M2
LEFT VENTRICLE INTERNAL DIMENSION DIASTOLE: 5.1 CM (ref 3.5–6)
LEFT VENTRICULAR OUTFLOW TRACT DIAMETER: 2.4 CM
MITRAL VALVE E/A RATIO: 2.08
MITRAL VALVE E/E' RATIO: 7.83
RIGHT VENTRICLE FREE WALL PEAK S': 12.3 CM/S
TRICUSPID ANNULAR PLANE SYSTOLIC EXCURSION: 2.6 CM

## 2024-07-05 PROCEDURE — 2500000001 HC RX 250 WO HCPCS SELF ADMINISTERED DRUGS (ALT 637 FOR MEDICARE OP): Performed by: NURSE PRACTITIONER

## 2024-07-05 PROCEDURE — 2500000001 HC RX 250 WO HCPCS SELF ADMINISTERED DRUGS (ALT 637 FOR MEDICARE OP): Performed by: STUDENT IN AN ORGANIZED HEALTH CARE EDUCATION/TRAINING PROGRAM

## 2024-07-05 PROCEDURE — 2500000002 HC RX 250 W HCPCS SELF ADMINISTERED DRUGS (ALT 637 FOR MEDICARE OP, ALT 636 FOR OP/ED): Performed by: NURSE PRACTITIONER

## 2024-07-05 PROCEDURE — 93306 TTE W/DOPPLER COMPLETE: CPT | Performed by: INTERNAL MEDICINE

## 2024-07-05 PROCEDURE — 2500000004 HC RX 250 GENERAL PHARMACY W/ HCPCS (ALT 636 FOR OP/ED): Performed by: NURSE PRACTITIONER

## 2024-07-05 PROCEDURE — 1100000001 HC PRIVATE ROOM DAILY

## 2024-07-05 PROCEDURE — 93306 TTE W/DOPPLER COMPLETE: CPT

## 2024-07-05 PROCEDURE — 99232 SBSQ HOSP IP/OBS MODERATE 35: CPT | Performed by: STUDENT IN AN ORGANIZED HEALTH CARE EDUCATION/TRAINING PROGRAM

## 2024-07-05 PROCEDURE — 2500000004 HC RX 250 GENERAL PHARMACY W/ HCPCS (ALT 636 FOR OP/ED): Performed by: STUDENT IN AN ORGANIZED HEALTH CARE EDUCATION/TRAINING PROGRAM

## 2024-07-05 RX ADMIN — METRONIDAZOLE 500 MG: 500 INJECTION, SOLUTION INTRAVENOUS at 11:59

## 2024-07-05 RX ADMIN — POLYETHYLENE GLYCOL 3350 17 G: 17 POWDER, FOR SOLUTION ORAL at 20:12

## 2024-07-05 RX ADMIN — METRONIDAZOLE 500 MG: 500 INJECTION, SOLUTION INTRAVENOUS at 05:16

## 2024-07-05 RX ADMIN — LURASIDONE HYDROCHLORIDE 40 MG: 40 TABLET, FILM COATED ORAL at 20:19

## 2024-07-05 RX ADMIN — SERTRALINE HYDROCHLORIDE 200 MG: 50 TABLET ORAL at 05:42

## 2024-07-05 RX ADMIN — SENNOSIDES AND DOCUSATE SODIUM 1 TABLET: 50; 8.6 TABLET ORAL at 20:19

## 2024-07-05 RX ADMIN — POLYETHYLENE GLYCOL 3350 17 G: 17 POWDER, FOR SOLUTION ORAL at 11:59

## 2024-07-05 RX ADMIN — PERFLUTREN 1.5 ML OF DILUTION: 6.52 INJECTION, SUSPENSION INTRAVENOUS at 15:35

## 2024-07-05 RX ADMIN — CEFTRIAXONE SODIUM 2 G: 2 INJECTION, POWDER, FOR SOLUTION INTRAMUSCULAR; INTRAVENOUS at 18:05

## 2024-07-05 RX ADMIN — LAMOTRIGINE 50 MG: 25 TABLET ORAL at 08:44

## 2024-07-05 RX ADMIN — METRONIDAZOLE 500 MG: 500 INJECTION, SOLUTION INTRAVENOUS at 20:18

## 2024-07-05 ASSESSMENT — COGNITIVE AND FUNCTIONAL STATUS - GENERAL
MOBILITY SCORE: 24
DAILY ACTIVITIY SCORE: 24
MOBILITY SCORE: 24
DAILY ACTIVITIY SCORE: 24

## 2024-07-05 ASSESSMENT — PAIN - FUNCTIONAL ASSESSMENT
PAIN_FUNCTIONAL_ASSESSMENT: 0-10
PAIN_FUNCTIONAL_ASSESSMENT: 0-10

## 2024-07-05 ASSESSMENT — PAIN SCALES - GENERAL
PAINLEVEL_OUTOF10: 0 - NO PAIN

## 2024-07-05 NOTE — CARE PLAN
The patient's goals for the shift include pt. will be able to have CT scan completed.    The clinical goals for the shift include Pt will have BM by 7.6.24      Problem: Pain  Goal: Takes deep breaths with improved pain control throughout the shift  Outcome: Progressing  Goal: Turns in bed with improved pain control throughout the shift  Outcome: Progressing  Goal: Walks with improved pain control throughout the shift  Outcome: Progressing  Goal: Performs ADL's with improved pain control throughout shift  Outcome: Progressing  Goal: Participates in PT with improved pain control throughout the shift  Outcome: Progressing  Goal: Free from opioid side effects throughout the shift  Outcome: Progressing  Goal: Free from acute confusion related to pain meds throughout the shift  Outcome: Progressing     Problem: Pain - Adult  Goal: Verbalizes/displays adequate comfort level or baseline comfort level  Outcome: Progressing     Problem: Safety - Adult  Goal: Free from fall injury  Outcome: Progressing     Problem: Discharge Planning  Goal: Discharge to home or other facility with appropriate resources  Outcome: Progressing

## 2024-07-05 NOTE — PROGRESS NOTES
Assessment/Plan   John Gutierrez is a 29 y.o. male with a past medical history of spina bifida posttreatment chait cecostomy drainage for the neurogenic bowel, neurogenic bladder, status post gastric sleeve surgery in 2018, s/p meningocele repair who presented to the ED for a wound check.  Pt had recent associated abscess drained in June s/p treatment with clinda, notes feeling body aches, fevers, chills, headache and having increased drainage from the drain site. Reports and I&D was preformed recently, no culture was collected.  Pt started clinda 2 days prior to presentation, reports he has had some improvement. On admit, afebrile, no leukocytosis. UA with positive nitrite, LE and wbcs. CT showed RLQ cecostomy site, skin thickening and soft tissue thickening along the tract, incomplete fistulous communication of a prior cecostomy tract to RLQ subcutaneous fat, more superficial tubular soft tissue density suggestive of scarring. There was urinarary bladder wall thickening which could correlate with cystitis v. Neurogenic bladder. Trace pelvic ascites noted for  shunting. Started on vanc/zosyn. ACS consutled for eval cecostomy tube and wound adjacent although no deeper process concerning at this time. Pt did grow salmonella from blood culture and Ucx is pending, still suspect GI source. Changed to vanc/ceftriaxone 2g daily, flagyl. Likely dc vanc toorrow pending further culture. Ordered TTE and CT chest with contrast to eval the aorta. Wll involve ID for further input. Awaiting cultures to finalize and echo.     #Salmonella bacteremia  #Cellulitis, abdominal wall. ?prior deeper abscess s/p drainage  #Spina Bifida s/p Chait cecostomy drain placement for neuogenic bowel  #neurogenic bladder   -Afebrile on arrival, no leukocytosis  - pt reported prior superfical abscess drainage and no cultures taken, stated it was about 1 cm below the surface of the skin.   - reported to have had drainage of purulant fluid  "previously, unclear if related to fistulous tract.  Now appears serous  - ACS consutled for eval cecostomy tube and wound adjacent although no deeper process concerning at this time. No intervention, altough need to discuss with ID relavence of devices in setting of salmonella.   -Wound culture ordered  - Noted uti on UA but culture showed no growth  -blood cultures growing salmonella, repeat pending  - stool pcr and leukocytes pending  - wound care consult for care around cecostomy  - will change to ceftriaxone 2g q24hrs, add flagyl for anaerobic coverage, cont vanc for now and reasees need for mrsa coverage tomorrow.   - ID consult - anticipate 2 weeks of abx, pt would like to know if he can retrial cipro given remote allergy, will discuss with ID.  - clarifying bowel regimen: pt report 1 cup mixed with miralax followed by saline \"states mixes salt in 1 cup of water\". Later her mom called stated she is the pcp for the patient and the she tried to place orders through the nurse verbally. Per discussion with patient and sister these directions were different from what they reported. Will need clarification today.   - after clarification: pt takes about 1 cup/1cup regimen but will use 3-4 cups of salt water if not had a bm for some time. Requested stool softener and enema which were ordered. Bowel regimen through cecostomy made BID and if pt is symptomatic will check kub to further assess.   - at this time Unclear source of infection but patient reports drinking stream water while hiking with an unknown filter , also unclear if using contaminated water for his but bowel regimen or foodborne.  Unclear significance to his  shunt and cath at this time, discussing with ID.  Unclear significance of mild splenomegaly.  At this time leaning toward 2 weeks of IV ceftriaxone with midline if repeat cultures are negative and monitoring for recurrence.    - echo still pending    #r/o Hyperkalemia  -Specimen marked " "hemolyzed  -Redraw WNL (with mild hemolysis)     #ADHD  #OCD  #Depression  #Anxiety  -Chronic  -Continue home lamictal, lurasidone, sertraline, methylphenidate (Made prn while in the hospital)      Patient would like to have a primary care establish in [place] although his still followed by primary care in [place] (family), has good family support, currently in grad school.  May need home care services for discharge purposes.    Scheduled outpatient appointments in system:   No future appointments.  ---------------------------------------------------------------------------------------------------  Subjective   No new events, no new complaints. Was able to move bowels. He denies f/c, no pain. He reports cipro allergy at 4 years old, would like to see if he can retrial it if indicated and possible.      ---------------------------------------------------------------------------------------------------  Objective   Last Recorded Vitals  Blood pressure 117/68, pulse 85, temperature 36.2 °C (97.2 °F), resp. rate 14, height 1.778 m (5' 10\"), weight 120 kg (265 lb), SpO2 97%.  Intake/Output last 3 Shifts:  I/O last 3 completed shifts:  In: 1000 (8.3 mL/kg) [IV Piggyback:1000]  Out: - (0 mL/kg)   Weight: 120.2 kg     Physical Exam  Vitals and nursing note reviewed.   Constitutional:       General: He is not in acute distress.     Appearance: Normal appearance. He is obese. He is not ill-appearing or toxic-appearing.   HENT:      Head: Normocephalic and atraumatic.      Mouth/Throat:      Mouth: Mucous membranes are moist.   Eyes:      General: No scleral icterus.     Extraocular Movements: Extraocular movements intact.      Conjunctiva/sclera: Conjunctivae normal.   Cardiovascular:      Rate and Rhythm: Normal rate and regular rhythm.      Heart sounds: S1 normal and S2 normal. No murmur heard.  Pulmonary:      Effort: Pulmonary effort is normal. No respiratory distress.      Breath sounds: No wheezing, rhonchi or " rales.   Abdominal:      General: Bowel sounds are normal. There is no distension.      Palpations: Abdomen is soft.      Tenderness: There is no abdominal tenderness. There is no guarding or rebound.      Comments: Cecostomy tube, surrounding skin irritation, superficial area of serous drainage adjacent to tube insertion, lower to the tube s a scar, no significant signs of superficial infection.  Nontender no rebound, overall nonacute   Musculoskeletal:         General: No swelling or deformity.      Cervical back: Neck supple.   Skin:     General: Skin is warm and dry.      Findings: No rash.   Neurological:      General: No focal deficit present.      Mental Status: He is alert. Mental status is at baseline.   Psychiatric:         Mood and Affect: Mood normal.         Relevant Results  Lab Results   Component Value Date    WBC 4.4 07/02/2024    HGB 15.4 07/02/2024    HCT 43.4 07/02/2024    MCV 83 07/02/2024     (L) 07/02/2024      Lab Results   Component Value Date    GLUCOSE 73 (L) 07/02/2024    CALCIUM 9.4 07/02/2024     (L) 07/02/2024    K 4.0 07/02/2024    CO2 23 07/02/2024     07/02/2024    BUN 8 07/02/2024    CREATININE 0.79 07/02/2024     Scheduled medications  cefTRIAXone, 2 g, intravenous, q24h  lamoTRIgine, 50 mg, oral, Daily  lurasidone, 40 mg, oral, q PM  metroNIDAZOLE, 500 mg, intravenous, q8h  ondansetron, 4 mg, intravenous, Once  perflutren lipid microspheres, 0.5-10 mL of dilution, intravenous, Once in imaging  sennosides-docusate sodium, 1 tablet, oral, Nightly  sertraline, 200 mg, oral, Daily      Continuous medications     PRN medications  PRN medications: acetaminophen **OR** acetaminophen **OR** acetaminophen, melatonin, methylphenidate ER, polyethylene glycol, polyethylene glycol, sodium chloride    Jose Rafael Valdez MD

## 2024-07-05 NOTE — NURSING NOTE
"Pt. Received enema via cecum tubing/drain with  no output. NS enema given;some relief of very little stool passed. Pt. Father @ bedside and asked for \"ALISSA Kumar\" to disimpact his son. MD notified that father who is a physician wanted to disimpact pt. In the bathroom. Pt. was disimpacted by father @ bedside and no stool was there past his digitalis, per pts' father (MD). Md made aware and pt. Requesting enema for later .Jp Blank RN  "

## 2024-07-05 NOTE — PROGRESS NOTES
"John Gutierrez is a 29 y.o. male on day 3 of admission presenting with Cellulitis, abdominal wall.    Subjective   Patient mentioned he had increased stool output from cecostomy.  Had a large BM yesterday after enema.  Stayed Afebrile.    Objective   Range of Vitals (last 24 hours)  Heart Rate:  [70-88]   Temp:  [36.2 °C (97.2 °F)-36.7 °C (98.1 °F)]   Resp:  [14-18]   BP: (106-117)/(67-68)   SpO2:  [96 %-98 %]   Daily Weight  07/02/24 : 120 kg (265 lb)    Body mass index is 38.02 kg/m².    Physical Exam  General: Patient is laying in bed, in no acute distress.   CVS: S1/S2 audible, no M/G/R.  Resp: Breathing comfortably on RA. Normal vesicular breathing. No wheezing, crackles or rhonchi auscultated.   Abd: Non distended, non tender, no organomegaly was appreciated. +BS. Cecostomy seen on right side with decreased surrounding erythema, no drainage was noted, Non tender.  CNS: AAO x4. No gross focal deficits appreciated.  Skin: No rashes or wounds seen.     Relevant Results  Labs  Results from last 72 hours   Lab Units 07/02/24  1843   WBC AUTO x10*3/uL 4.4   HEMOGLOBIN g/dL 15.4   HEMATOCRIT % 43.4   PLATELETS AUTO x10*3/uL 142*   LYMPHO PCT MAN % 48.3   MONO PCT MAN % 4.2   EOSINO PCT MAN % 0.9     Results from last 72 hours   Lab Units 07/02/24 2035 07/02/24  1843   SODIUM mmol/L  --  133*   POTASSIUM mmol/L 4.0 5.8*   CHLORIDE mmol/L  --  100   CO2 mmol/L  --  23   BUN mg/dL  --  8   CREATININE mg/dL  --  0.79   GLUCOSE mg/dL  --  73*   CALCIUM mg/dL  --  9.4   ANION GAP mmol/L  --  16   EGFR mL/min/1.73m*2  --  >90     Results from last 72 hours   Lab Units 07/02/24  1843   ALK PHOS U/L 103   BILIRUBIN TOTAL mg/dL 0.9   PROTEIN TOTAL g/dL 9.0*   ALT U/L 24   AST U/L 51*   ALBUMIN g/dL 5.0     Estimated Creatinine Clearance: 125 mL/min (by C-G formula based on SCr of 0.79 mg/dL).  No results found for: \"CRP\"  Microbiology  Susceptibility data from last 14 days.  Collected Specimen Info Organism   07/02/24 Blood " culture from Peripheral Venipuncture Salmonella     Imaging  CT Abd/Pelvis: 7/2:  Right lower quadrant cecostomy site, demonstrating skin thickening  and somewhat prominent soft tissue thickening along the catheter  tract in the subcutaneous fat, suggesting inflammation/possible  infection. More inferiorly there is an apparent incomplete fistulous  communication of a prior cecostomy tract from the cecum to the right  lower quadrant subcutaneous fat, with more superficial tubular soft  tissue density extending to the skin surface suggesting scarring.  There is no significant inflammatory change, subcutaneous air or  fluid collection in the abdominal wall soft tissues.     Urinary bladder is incompletely distended and not well evaluated.  Urinary bladder wall thickening may relate to detrusor hypertrophy  from neurogenic bladder or cystitis. Correlate clinically with the  need for urinalysis to exclude infection.     Trace pelvic ascites, probably secondary to ventriculoperitoneal  shunting. No pneumoperitoneum or loculated intraperitoneal collection.     Splenomegaly.     A ventriculoperitoneal shunt catheter is noted which demonstrates  prominent focal calcification along its course in the right lateral  chest wall along the 9th rib and intercostal space (series 203,  images [id number]), limiting evaluation of continuity of the catheter in  this region; there is no adjacent fluid in this region, however.     Additional findings as discussed above      CT Chest with IV Contrast: 7/3:  1. No evidence of acute pathology. No evidence of aortic pathology.  2. Incidental Finding:  A non-calcified pulmonary nodule/multiple  non-calcified pulmonary nodules measuring less than 6 mm, likely  benign.     TTE: 7/3:  Left Ventricle: Left ventricular ejection fraction is normal, calculated by Mariee's biplane at 66%. There are no regional wall motion abnormalities.   The left ventricular cavity size is normal. Spectral Doppler shows  a normal pattern of left ventricular diastolic filling.  Left Atrium: The left atrium is normal in size.  Right Ventricle: The right ventricle is normal in size. There is normal right ventricular global systolic function.  Right Atrium: The right atrium is normal in size.  Aortic Valve: The aortic valve is trileaflet. There is no evidence of aortic valve regurgitation. The peak instantaneous gradient of the aortic valve is 7.2 mmHg.  Mitral Valve: The mitral valve is normal in structure. There is no evidence of mitral valve regurgitation.  Tricuspid Valve: The tricuspid valve is structurally normal. There is trace tricuspid regurgitation. The right ventricular systolic pressure is unable to be estimated.  Pulmonic Valve: The pulmonic valve is structurally normal. There is physiologic pulmonic valve regurgitation.  Pericardium: There is a trivial pericardial effusion.  Aorta: The aortic root is normal.  Systemic Veins: The inferior vena cava appears to be of normal size. There is IVC inspiratory collapse greater than 50%.  In comparison to the previous echocardiogram(s): There are no prior studies on this patient for comparison purposes.   Order PRN epi-pen and antihistamine and check baseline vitals, give cipro 500mg x1 dose, check vitals 1 hour after admin      Micro:  7/2: Blood cultures: Salmonella 1/4 bottles  7/2: Urine culture: Negative  7/4: Tissue/wound culture: In process  7/5: Blood Cx: NGTD     Antibiotics:  IV ceftriaxone: 7/3 - p  IV metronidazole: 7/3 - p  --------------------------------  IV pip-tazo: 7/2 - 7/3  IV vancomycin: 7/2 - 7/3        Assessment/Plan   29 y.o. male with past medical history of spina bifida posttreatment with chait-cecostomy drainage for the neurogenic bowel, neurogenic bladder, s/p gastric sleeve surgery in 2018, s/p meningocele repair presented on 7/2 for increased drainage from chait cecostomy site. On presentation was found to have yellow colored drainage from cecostomy,  and the area was slightly erythematous, tender but without any induration or fluctuance.  CT abdomen and pelvis demonstrated skin thickening and somewhat prominent soft tissues thickening along the catheter tract in the subcutaneous fat suggesting inflammation. Urinalysis showed asymp pyuria.  Blood cultures drawn in the emergency grew Salmonella.       Cecostomy site superficial infection:  Patient is s/p course with clindamycin and Augmentin, and reports a prior abscess which was drained. Noted to have a fistulous communication from prior cecostomy tract. Evaluated by surgery for the fistulous tract around the cecostomy tube and given no acute intra-abdominal process was not recommended any intervention. Abdomen is stable as of now, but if he continues to have symptoms the cecostomy area might need further imaging such as fistulogram.      Salmonella Bacteremia:  Possible exposures are raw brownie batter? But he used store bought eggs. He also drank from river water but it was filtered. Salmonella has been reported in rain water in the past. Patient is allergic to Ciprofloxacin which would have been a reasonable regimen for Salmonella. Patient denied any active GI symptoms currently; had a HA prior to admission that has improved, but overall lower suspicion for meningitis/ shunt infection based on symptomatology and presentation otherwise. TTE from 7/5 did not show any abnormalities.      Clinical Impression: Salmonella Bacteremia, Cecostomy site infection     Recommendations:  Continue IV Ceftriaxone 2G Q24H and PO flagyl 500 TID  Will follow up on sensitivities as resistance is not uncommon with Salmonella, though his clinical improvement is reassuring.  Will follow up on repeat blood cultures and stool testing.  Anticipating 2 weeks of the above regimen assuming adequate clearance of cultures and reassuring TTE  Discussed chance of cecocutaneous fistula with Mr. Gutierrez and his family. Reasonable to treat the  above issues then determine if a new fistulous connection might be arising/responsible for the recurrent ST infections in that area he has been experiencing.  Ciprofloxacin allergy test as per patients request. Explained the entire process. Instructions are as follows:  Please avoid steroids, antihistamines or beta blockers prior to administration (these can be held until after administration)  Please have nursing staff check baseline vitals before administering Ciprofloxacin.   Please administer 500 mg PO Ciprofloxacin x1 dose.  Check vitals again one hour after administration  Order anaphylactic kit/PRN epi, and antihistamine at the bedside in the room/bedside.     Plan was discussed with ID attending Dr Singleton.  We will continue to follow the patient.      Tanya Paz MD  [id number], ID Fellow.   Team B Pager: [phone]  For new consults, contact pager [phone].   EPIC chat preferred.      Tanya Paz MD

## 2024-07-06 LAB
BACTERIA BLD AEROBE CULT: ABNORMAL
BACTERIA BLD CULT: ABNORMAL
BACTERIA BLD CULT: NORMAL
GRAM STN SPEC: ABNORMAL
LACTOFERRIN STL QL IA: NEGATIVE

## 2024-07-06 PROCEDURE — 99232 SBSQ HOSP IP/OBS MODERATE 35: CPT | Performed by: STUDENT IN AN ORGANIZED HEALTH CARE EDUCATION/TRAINING PROGRAM

## 2024-07-06 PROCEDURE — 1100000001 HC PRIVATE ROOM DAILY

## 2024-07-06 PROCEDURE — 2500000004 HC RX 250 GENERAL PHARMACY W/ HCPCS (ALT 636 FOR OP/ED): Performed by: NURSE PRACTITIONER

## 2024-07-06 PROCEDURE — 2500000004 HC RX 250 GENERAL PHARMACY W/ HCPCS (ALT 636 FOR OP/ED): Performed by: STUDENT IN AN ORGANIZED HEALTH CARE EDUCATION/TRAINING PROGRAM

## 2024-07-06 PROCEDURE — 2500000002 HC RX 250 W HCPCS SELF ADMINISTERED DRUGS (ALT 637 FOR MEDICARE OP, ALT 636 FOR OP/ED): Performed by: NURSE PRACTITIONER

## 2024-07-06 PROCEDURE — 2500000001 HC RX 250 WO HCPCS SELF ADMINISTERED DRUGS (ALT 637 FOR MEDICARE OP): Performed by: STUDENT IN AN ORGANIZED HEALTH CARE EDUCATION/TRAINING PROGRAM

## 2024-07-06 PROCEDURE — 2500000001 HC RX 250 WO HCPCS SELF ADMINISTERED DRUGS (ALT 637 FOR MEDICARE OP): Performed by: NURSE PRACTITIONER

## 2024-07-06 RX ORDER — DIPHENHYDRAMINE HYDROCHLORIDE 50 MG/ML
50 INJECTION INTRAMUSCULAR; INTRAVENOUS ONCE AS NEEDED
Status: DISCONTINUED | OUTPATIENT
Start: 2024-07-06 | End: 2024-07-07 | Stop reason: HOSPADM

## 2024-07-06 RX ORDER — ALBUTEROL SULFATE 0.83 MG/ML
3 SOLUTION RESPIRATORY (INHALATION)
Status: DISCONTINUED | OUTPATIENT
Start: 2024-07-06 | End: 2024-07-07 | Stop reason: HOSPADM

## 2024-07-06 RX ORDER — CIPROFLOXACIN 500 MG/1
500 TABLET ORAL ONCE
Status: COMPLETED | OUTPATIENT
Start: 2024-07-06 | End: 2024-07-06

## 2024-07-06 RX ORDER — FAMOTIDINE 10 MG/ML
20 INJECTION INTRAVENOUS ONCE AS NEEDED
Status: DISCONTINUED | OUTPATIENT
Start: 2024-07-06 | End: 2024-07-07 | Stop reason: HOSPADM

## 2024-07-06 RX ORDER — EPINEPHRINE 1 MG/ML
0.3 INJECTION, SOLUTION, CONCENTRATE INTRAVENOUS ONCE AS NEEDED
Status: DISCONTINUED | OUTPATIENT
Start: 2024-07-06 | End: 2024-07-07 | Stop reason: HOSPADM

## 2024-07-06 RX ADMIN — LAMOTRIGINE 50 MG: 25 TABLET ORAL at 09:27

## 2024-07-06 RX ADMIN — METRONIDAZOLE 500 MG: 500 INJECTION, SOLUTION INTRAVENOUS at 05:49

## 2024-07-06 RX ADMIN — LURASIDONE HYDROCHLORIDE 40 MG: 40 TABLET, FILM COATED ORAL at 21:31

## 2024-07-06 RX ADMIN — METRONIDAZOLE 500 MG: 500 INJECTION, SOLUTION INTRAVENOUS at 21:23

## 2024-07-06 RX ADMIN — POLYETHYLENE GLYCOL 3350 17 G: 17 POWDER, FOR SOLUTION ORAL at 11:01

## 2024-07-06 RX ADMIN — METRONIDAZOLE 500 MG: 500 INJECTION, SOLUTION INTRAVENOUS at 12:18

## 2024-07-06 RX ADMIN — SENNOSIDES AND DOCUSATE SODIUM 1 TABLET: 50; 8.6 TABLET ORAL at 21:31

## 2024-07-06 RX ADMIN — CEFTRIAXONE SODIUM 2 G: 2 INJECTION, POWDER, FOR SOLUTION INTRAMUSCULAR; INTRAVENOUS at 17:48

## 2024-07-06 RX ADMIN — SERTRALINE HYDROCHLORIDE 200 MG: 50 TABLET ORAL at 09:27

## 2024-07-06 RX ADMIN — Medication 10 MG: at 21:31

## 2024-07-06 RX ADMIN — CIPROFLOXACIN 500 MG: 500 TABLET, FILM COATED ORAL at 09:27

## 2024-07-06 ASSESSMENT — PAIN SCALES - GENERAL
PAINLEVEL_OUTOF10: 0 - NO PAIN

## 2024-07-06 ASSESSMENT — COGNITIVE AND FUNCTIONAL STATUS - GENERAL
MOBILITY SCORE: 24
MOBILITY SCORE: 24
DAILY ACTIVITIY SCORE: 24
DAILY ACTIVITIY SCORE: 24

## 2024-07-06 ASSESSMENT — PAIN - FUNCTIONAL ASSESSMENT
PAIN_FUNCTIONAL_ASSESSMENT: 0-10
PAIN_FUNCTIONAL_ASSESSMENT: 0-10

## 2024-07-06 NOTE — CARE PLAN
The patient's goals for the shift include pt will not have a reaction to Cipro    The clinical goals for the shift include patient will remain HDS throughout entire shift      Problem: Pain  Goal: Takes deep breaths with improved pain control throughout the shift  Outcome: Progressing  Goal: Turns in bed with improved pain control throughout the shift  Outcome: Progressing  Goal: Walks with improved pain control throughout the shift  Outcome: Progressing  Goal: Performs ADL's with improved pain control throughout shift  Outcome: Progressing  Goal: Participates in PT with improved pain control throughout the shift  Outcome: Progressing  Goal: Free from opioid side effects throughout the shift  Outcome: Progressing  Goal: Free from acute confusion related to pain meds throughout the shift  Outcome: Progressing     Problem: Safety - Adult  Goal: Free from fall injury  Outcome: Progressing

## 2024-07-06 NOTE — PROGRESS NOTES
Assessment/Plan   John Gutierrez is a 29 y.o. male with a past medical history of spina bifida posttreatment chait cecostomy drainage for the neurogenic bowel, neurogenic bladder, status post gastric sleeve surgery in 2018, s/p meningocele repair who presented to the ED for a wound check.  Pt had recent associated abscess drained in June s/p treatment with clinda, notes feeling body aches, fevers, chills, headache and having increased drainage from the drain site. Reports and I&D was preformed recently, no culture was collected.  Pt started clinda 2 days prior to presentation, reports he has had some improvement. On admit, afebrile, no leukocytosis. UA with positive nitrite, LE and wbcs. CT showed RLQ cecostomy site, skin thickening and soft tissue thickening along the tract, incomplete fistulous communication of a prior cecostomy tract to RLQ subcutaneous fat, more superficial tubular soft tissue density suggestive of scarring. There was urinarary bladder wall thickening which could correlate with cystitis v. Neurogenic bladder. Trace pelvic ascites noted for  shunting. Started on vanc/zosyn. ACS consutled for eval cecostomy tube and wound adjacent although no deeper process concerning at this time. Pt did grow salmonella from blood culture and Ucx is pending, still suspect GI source. Changed to vanc/ceftriaxone 2g daily, flagyl. Likely dc vanc toorrow pending further culture. Ordered TTE and CT chest with contrast to eval the aorta. Wll involve ID for further input. Awaiting cultures to finalize and echo.     #Salmonella bacteremia  #Cellulitis, abdominal wall. ?prior deeper abscess s/p drainage  #Spina Bifida s/p Chait cecostomy drain placement for neuogenic bowel  #neurogenic bladder   -Afebrile on arrival, no leukocytosis  - pt reported prior superfical abscess drainage and no cultures taken, stated it was about 1 cm below the surface of the skin.   - reported to have had drainage of purulant fluid  "previously, unclear if related to fistulous tract.  Now appears serous  - ACS consutled for eval cecostomy tube and wound adjacent although no deeper process concerning at this time. No intervention, altough need to discuss with ID relavence of devices in setting of salmonella.   -Wound culture ordered  - Noted uti on UA but culture showed no growth  -blood cultures growing salmonella, repeat pending   - stool pcr and leukocytes pending  - echo was normal  - wound care consult for care around cecostomy  - will change to ceftriaxone 2g q24hrs, add flagyl for anaerobic coverage, cont vanc for now and reasees need for mrsa coverage tomorrow.   - ID consult - anticipate 2 weeks of abx, pt would like to know if he can retrial cipro given remote allergy, will discuss with ID.  - clarifying bowel regimen: pt report 1 cup mixed with miralax followed by saline \"states mixes salt in 1 cup of water\". Later her mom called stated she is the pcp for the patient and the she tried to place orders through the nurse verbally. Per discussion with patient and sister these directions were different from what they reported. Will need clarification today.   - after clarification: pt takes about 1 cup/1cup regimen but will use 3-4 cups of salt water if not had a bm for some time. Requested stool softener and enema which were ordered. Bowel regimen through cecostomy made BID and if pt is symptomatic will check kub to further assess.   - at this time Unclear source of infection but patient reports drinking stream water while hiking with an unknown filter , also unclear if using contaminated water for his but bowel regimen or foodborne.  Unclear significance to his  shunt and cath at this time, discussing with ID.  Unclear significance of mild splenomegaly.  At this time leaning toward 2 weeks of IV ceftriaxone with midline if repeat cultures are negative and monitoring for recurrence.      #pulmonary nodules  - suspect inflammatory  - " "surveillance 1 yr discussed    #r/o Hyperkalemia  -Specimen marked hemolyzed  -Redraw WNL (with mild hemolysis)     #ADHD  #OCD  #Depression  #Anxiety  -Chronic  -Continue home lamictal, lurasidone, sertraline, methylphenidate (Made prn while in the hospital)      Patient would like to have a primary care establish in [place] although his still followed by primary care in [place] (family), has good family support, currently in grad school.  May need home care services for discharge purposes. If not IV abx will not need home care. Ok with wound care consult but family feels comfortable management cecostomy and do not want it to hold dc.     Scheduled outpatient appointments in system:   No future appointments.  ---------------------------------------------------------------------------------------------------  Subjective   No new events, no new complaints. Wants to trial cipro, discussed risks, he also discussed with ID.  Trialed this am without reactoin, waiting for final ID recs on abx plan after cultures resulted.      ---------------------------------------------------------------------------------------------------  Objective   Last Recorded Vitals  Blood pressure 99/62, pulse 62, temperature 36.6 °C (97.9 °F), resp. rate 16, height 1.778 m (5' 10\"), weight 120 kg (265 lb), SpO2 95%.  Intake/Output last 3 Shifts:  I/O last 3 completed shifts:  In: 1000 (8.3 mL/kg) [IV Piggyback:1000]  Out: - (0 mL/kg)   Weight: 120.2 kg     Physical Exam  Vitals and nursing note reviewed.   Constitutional:       General: He is not in acute distress.     Appearance: Normal appearance. He is obese. He is not ill-appearing or toxic-appearing.   HENT:      Head: Normocephalic and atraumatic.      Mouth/Throat:      Mouth: Mucous membranes are moist.   Eyes:      General: No scleral icterus.     Extraocular Movements: Extraocular movements intact.      Conjunctiva/sclera: Conjunctivae normal.   Cardiovascular:      Rate and " Rhythm: Normal rate and regular rhythm.      Heart sounds: S1 normal and S2 normal. No murmur heard.  Pulmonary:      Effort: Pulmonary effort is normal. No respiratory distress.      Breath sounds: No wheezing, rhonchi or rales.   Abdominal:      General: Bowel sounds are normal. There is no distension.      Palpations: Abdomen is soft.      Tenderness: There is no abdominal tenderness. There is no guarding or rebound.      Comments: Cecostomy tube, surrounding skin irritation, superficial area of serous drainage adjacent to tube insertion, lower to the tube s a scar, no significant signs of superficial infection.  Nontender no rebound, overall nonacute   Musculoskeletal:         General: No swelling or deformity.      Cervical back: Neck supple.   Skin:     General: Skin is warm and dry.      Findings: No rash.   Neurological:      General: No focal deficit present.      Mental Status: He is alert. Mental status is at baseline.   Psychiatric:         Mood and Affect: Mood normal.         Relevant Results  Lab Results   Component Value Date    WBC 4.4 07/02/2024    HGB 15.4 07/02/2024    HCT 43.4 07/02/2024    MCV 83 07/02/2024     (L) 07/02/2024      Lab Results   Component Value Date    GLUCOSE 73 (L) 07/02/2024    CALCIUM 9.4 07/02/2024     (L) 07/02/2024    K 4.0 07/02/2024    CO2 23 07/02/2024     07/02/2024    BUN 8 07/02/2024    CREATININE 0.79 07/02/2024     Scheduled medications  cefTRIAXone, 2 g, intravenous, q24h  ciprofloxacin, 500 mg, oral, Once  lamoTRIgine, 50 mg, oral, Daily  lurasidone, 40 mg, oral, q PM  metroNIDAZOLE, 500 mg, intravenous, q8h  ondansetron, 4 mg, intravenous, Once  sennosides-docusate sodium, 1 tablet, oral, Nightly  sertraline, 200 mg, oral, Daily      Continuous medications     PRN medications  PRN medications: acetaminophen **OR** acetaminophen **OR** acetaminophen, albuterol, diphenhydrAMINE, EPINEPHrine HCl, famotidine PF, melatonin, methylphenidate ER,  methylPREDNISolone sodium succinate (PF), polyethylene glycol, polyethylene glycol, sodium chloride    Jose Rafael Valdez MD

## 2024-07-07 VITALS
BODY MASS INDEX: 37.94 KG/M2 | SYSTOLIC BLOOD PRESSURE: 109 MMHG | TEMPERATURE: 98.2 F | HEIGHT: 70 IN | WEIGHT: 265 LBS | OXYGEN SATURATION: 97 % | DIASTOLIC BLOOD PRESSURE: 70 MMHG | HEART RATE: 77 BPM | RESPIRATION RATE: 16 BRPM

## 2024-07-07 PROBLEM — L03.311 CELLULITIS, ABDOMINAL WALL: Status: RESOLVED | Noted: 2024-07-02 | Resolved: 2024-07-07

## 2024-07-07 LAB
BACTERIA BLD CULT: NORMAL
BACTERIA BLD CULT: NORMAL
BACTERIA SPEC CULT: NORMAL
GRAM STN SPEC: NORMAL
GRAM STN SPEC: NORMAL

## 2024-07-07 PROCEDURE — 2500000001 HC RX 250 WO HCPCS SELF ADMINISTERED DRUGS (ALT 637 FOR MEDICARE OP): Performed by: STUDENT IN AN ORGANIZED HEALTH CARE EDUCATION/TRAINING PROGRAM

## 2024-07-07 PROCEDURE — 2500000004 HC RX 250 GENERAL PHARMACY W/ HCPCS (ALT 636 FOR OP/ED): Performed by: NURSE PRACTITIONER

## 2024-07-07 PROCEDURE — 2500000004 HC RX 250 GENERAL PHARMACY W/ HCPCS (ALT 636 FOR OP/ED): Mod: JZ | Performed by: STUDENT IN AN ORGANIZED HEALTH CARE EDUCATION/TRAINING PROGRAM

## 2024-07-07 PROCEDURE — 99239 HOSP IP/OBS DSCHRG MGMT >30: CPT | Performed by: STUDENT IN AN ORGANIZED HEALTH CARE EDUCATION/TRAINING PROGRAM

## 2024-07-07 PROCEDURE — 2500000002 HC RX 250 W HCPCS SELF ADMINISTERED DRUGS (ALT 637 FOR MEDICARE OP, ALT 636 FOR OP/ED): Performed by: NURSE PRACTITIONER

## 2024-07-07 RX ORDER — POLYETHYLENE GLYCOL 3350 17 G/17G
17 POWDER, FOR SOLUTION ORAL AS NEEDED
COMMUNITY

## 2024-07-07 RX ORDER — DOCUSATE SODIUM 100 MG/1
100 CAPSULE, LIQUID FILLED ORAL AS NEEDED
COMMUNITY

## 2024-07-07 RX ORDER — AMPICILLIN 500 MG/1
500 CAPSULE ORAL EVERY 6 HOURS SCHEDULED
Qty: 40 CAPSULE | Refills: 0 | Status: SHIPPED | OUTPATIENT
Start: 2024-07-07 | End: 2024-07-07

## 2024-07-07 RX ORDER — AMPICILLIN 500 MG/1
500 CAPSULE ORAL EVERY 6 HOURS SCHEDULED
Qty: 40 CAPSULE | Refills: 0 | Status: SHIPPED | OUTPATIENT
Start: 2024-07-07 | End: 2024-07-17

## 2024-07-07 RX ADMIN — LAMOTRIGINE 50 MG: 25 TABLET ORAL at 09:51

## 2024-07-07 RX ADMIN — SERTRALINE HYDROCHLORIDE 200 MG: 50 TABLET ORAL at 06:28

## 2024-07-07 RX ADMIN — SODIUM CHLORIDE 250 ML: 9 INJECTION, SOLUTION INTRAVENOUS at 11:31

## 2024-07-07 RX ADMIN — METRONIDAZOLE 500 MG: 500 INJECTION, SOLUTION INTRAVENOUS at 11:31

## 2024-07-07 RX ADMIN — POLYETHYLENE GLYCOL 3350 17 G: 17 POWDER, FOR SOLUTION ORAL at 11:23

## 2024-07-07 RX ADMIN — METRONIDAZOLE 500 MG: 500 INJECTION, SOLUTION INTRAVENOUS at 06:28

## 2024-07-07 RX ADMIN — SODIUM CHLORIDE 250 ML: 9 INJECTION, SOLUTION INTRAVENOUS at 11:40

## 2024-07-07 ASSESSMENT — COGNITIVE AND FUNCTIONAL STATUS - GENERAL
MOBILITY SCORE: 24
DAILY ACTIVITIY SCORE: 24

## 2024-07-07 ASSESSMENT — PAIN SCALES - GENERAL: PAINLEVEL_OUTOF10: 0 - NO PAIN

## 2024-07-07 ASSESSMENT — PAIN - FUNCTIONAL ASSESSMENT: PAIN_FUNCTIONAL_ASSESSMENT: 0-10

## 2024-07-07 NOTE — NURSING NOTE
Discharge Summary reviewed with pt. and father with family in room. Pt. Made aware he has Apicillin to take until completely finished. Heplock removed, tip intact. Pt. Left with all personal effects. Ambulated off floor with family.Jp Blank RN

## 2024-07-07 NOTE — CARE PLAN
Problem: Pain - Adult  Goal: Verbalizes/displays adequate comfort level or baseline comfort level  Outcome: Progressing     Problem: Safety - Adult  Goal: Free from fall injury  Outcome: Progressing     Problem: Chronic Conditions and Co-morbidities  Goal: Patient's chronic conditions and co-morbidity symptoms are monitored and maintained or improved  Outcome: Progressing   The patient's goals for the shift include to have a good rest tonite    The clinical goals for the shift include Pt will not have any further s/s of of infection

## 2024-07-07 NOTE — CARE PLAN
The patient's goals for the shift include pt. will be discharged today.    The clinical goals for the shift include t. will be free from infection by    Over the shift, the patient did not make progress toward the following goals. Barriers to progression include able to go home with oral antibiotics. Recommendations to address these barriers include pt. Will take antibiotics until completely finished.

## 2024-07-07 NOTE — SIGNIFICANT EVENT
ID F/U    Salmonella sensitivities reveal highly sensitive organism.  Recommend:  Discontinue ceftriaxone/metronidazole  Start ampicillin  500 mg po q6 to complete 2 wk course with stop date 7/16/24.  No ID F/U needed

## 2024-07-07 NOTE — DISCHARGE INSTRUCTIONS
You are found to have Salmonella bacteremia.  You were treated with IV antibiotics and will transition to oral ampicillin and complete antibiotics on 7/16/2024. No infectious disease follow up needed unless persistent symptoms.     Please continue your home bowel regimen as needed    You requested referrals to primary care and interventional radiology to assess changing your cystostomy tube, referrals will be submitted in case you want to pursue care at , otherwise you can follow-up with your [facility] program as we discussed for further referrals.  Follow-up with primary care within 7 to 10 days or next soonest available for post-hospitalization assessment and examination.

## 2024-07-07 NOTE — CARE PLAN
Per attending likely discharge home today hopefully on oral antibiotics.  Still waiting on ID recommendations....Edilma Tian RN, BSN, TCC

## 2024-07-08 LAB
BACTERIA BLD CULT: NORMAL
BACTERIA BLD CULT: NORMAL

## 2024-07-18 NOTE — DISCHARGE SUMMARY
Date of Admission: 7/2/2024    Date of Discharge: 7/7/24    Discharge Diagnosis  Salmonella bacteremia  Constipation in setting of neurogenic bowel   Cellulitis, abdominal wall        Discharge Meds     Your medication list        START taking these medications        Instructions Last Dose Given Next Dose Due   ampicillin 500 mg capsule  Commonly known as: Principen      Take 1 capsule (500 mg) by mouth every 6 hours for 10 days. Last day of antibiotics is 7/16/24              CONTINUE taking these medications        Instructions Last Dose Given Next Dose Due   diazePAM 5 mg tablet  Commonly known as: Valium           diphenhydrAMINE 25 mg tablet  Commonly known as: Sominex           docusate sodium 100 mg capsule  Commonly known as: Colace           lamoTRIgine 100 mg tablet  Commonly known as: LaMICtal           lurasidone 40 mg tablet  Commonly known as: Latuda           methylphenidate ER 36 mg extended release tablet           polyethylene glycol 17 gram packet  Commonly known as: Glycolax, Miralax           rizatriptan 10 mg tablet  Commonly known as: Maxalt           sertraline 100 mg tablet  Commonly known as: Zoloft                  STOP taking these medications      clindamycin 300 mg capsule  Commonly known as: Cleocin        doxycycline 100 mg tablet  Commonly known as: Adoxa                  Where to Get Your Medications        These medications were sent to [facility]/pharmacy #[id number] - [facility], [place] - [address] AT [facility]  [address], [facility] [address]      Phone: [phone]   ampicillin 500 mg capsule         Test Results Pending At Discharge  Pending Labs       Order Current Status    Tissue/Wound Culture/Smear Preliminary result            Hospital Course  John Gutierrez is a 29 y.o. male with a past medical history of spina bifida posttreatment chait cecostomy drainage for the neurogenic bowel, neurogenic bladder, status post gastric sleeve surgery in 2018, s/p meningocele repair who  presented to the ED for a wound check. Pt had recent associated abscess drained in June s/p treatment with clinda, notes feeling body aches, fevers, chills, headache and having increased drainage from the drain site. Reports and I&D was preformed recently, no culture was collected. Pt started clinda 2 days prior to presentation, reports he has had some improvement. On admit, afebrile, no leukocytosis. UA with positive nitrite, LE and wbcs. CT showed RLQ cecostomy site, skin thickening and soft tissue thickening along the tract, incomplete fistulous communication of a prior cecostomy tract to RLQ subcutaneous fat, more superficial tubular soft tissue density suggestive of scarring. There was urinarary bladder wall thickening which could correlate with cystitis v. Neurogenic bladder. Trace pelvic ascites noted for  shunting. Started on vanc/zosyn. ACS consutled for eval cecostomy tube and wound adjacent although no deeper process concerning at this time. Pt did grow salmonella from blood culture and Ucx is pending, still suspect GI source. Changed to vanc/ceftriaxone 2g daily, flagyl. Likely dc vanc toorrow pending further culture. Ordered TTE and CT chest with contrast to eval the aorta. No significnat acute findings on imaging. Pt did have small pulmonary nodules, discussed with patient and family and planning 1 yr surevellence ct scan to be ordered by pcp. Ordered pcp op follow up and pt will decide if he wants to use , to speak to his health clinic. After cultures finalized, ID recommended to complete 2 weeks of ampicillin. Pt did have a cipro challenge (remote allergy) and tolerated.  Felt comfortable with tube/wound care and did not want to wait for ip consultation.  Requested IR consult for possible tube exchange as well op which was requested by family and pt for op referral. Discharge plan of care discussed, education and counseling provided involving active problem care plan, warning signs,   risks/benefits of new medications or medication changes, follow-up care and testing.  Patient advised to follow-up with primary care within 1 week of discharge or the next available for posthospitalization transition of care.  There was verbalized understanding and agreement with discharge care plan.    Pertinent Physical Exam At Time of Discharge  On the day of discharge, the patient reported feeling well and pain was controlled. Vitals and labs were stable. On exam: No acute distress, interactive, no increased work of breathing, regular rate and rhythm, +cecostomy tube,  abdomen soft/nontender, no edema.    Outpatient Follow-Up  Future Appointments   Date Time Provider Department Center   7/23/2024 12:00 PM [facility] IR 2 [facility]   8/22/2024  1:40 PM Olman Shelton MD [id number]       Pt instructed to take all medications as prescribed.  Keep all follow-up appointments.  Contact their primary care physician with any questions or concerns that arise.  Come to the emergency department with worsening of your symptoms or any other medical emergency.     Time spent >30 minutes on discharge management.    Jose Rafael Valdez MD

## 2024-07-23 ENCOUNTER — HOSPITAL ENCOUNTER (OUTPATIENT)
Dept: RADIOLOGY | Facility: HOSPITAL | Age: 29
Discharge: HOME | End: 2024-07-23
Payer: COMMERCIAL

## 2024-07-23 VITALS
RESPIRATION RATE: 15 BRPM | SYSTOLIC BLOOD PRESSURE: 93 MMHG | TEMPERATURE: 97.7 F | HEIGHT: 70 IN | BODY MASS INDEX: 37.94 KG/M2 | HEART RATE: 62 BPM | OXYGEN SATURATION: 98 % | DIASTOLIC BLOOD PRESSURE: 60 MMHG | WEIGHT: 265 LBS

## 2024-07-23 DIAGNOSIS — Z93.3 CECOSTOMY STATUS (MULTI): ICD-10-CM

## 2024-07-23 PROCEDURE — 2500000004 HC RX 250 GENERAL PHARMACY W/ HCPCS (ALT 636 FOR OP/ED): Performed by: RADIOLOGY

## 2024-07-23 PROCEDURE — 2720000007 HC OR 272 NO HCPCS

## 2024-07-23 PROCEDURE — 7100000009 HC PHASE TWO TIME - INITIAL BASE CHARGE

## 2024-07-23 PROCEDURE — 2550000001 HC RX 255 CONTRASTS: Performed by: RADIOLOGY

## 2024-07-23 PROCEDURE — 7100000010 HC PHASE TWO TIME - EACH INCREMENTAL 1 MINUTE

## 2024-07-23 PROCEDURE — C1729 CATH, DRAINAGE: HCPCS

## 2024-07-23 PROCEDURE — 99153 MOD SED SAME PHYS/QHP EA: CPT

## 2024-07-23 PROCEDURE — 75984 XRAY CONTROL CATHETER CHANGE: CPT | Performed by: RADIOLOGY

## 2024-07-23 PROCEDURE — 99152 MOD SED SAME PHYS/QHP 5/>YRS: CPT

## 2024-07-23 PROCEDURE — C1887 CATHETER, GUIDING: HCPCS

## 2024-07-23 PROCEDURE — 99153 MOD SED SAME PHYS/QHP EA: CPT | Performed by: RADIOLOGY

## 2024-07-23 PROCEDURE — C1769 GUIDE WIRE: HCPCS

## 2024-07-23 PROCEDURE — 99152 MOD SED SAME PHYS/QHP 5/>YRS: CPT | Performed by: RADIOLOGY

## 2024-07-23 RX ORDER — FENTANYL CITRATE 50 UG/ML
INJECTION, SOLUTION INTRAMUSCULAR; INTRAVENOUS
Status: COMPLETED | OUTPATIENT
Start: 2024-07-23 | End: 2024-07-23

## 2024-07-23 RX ORDER — MIDAZOLAM HYDROCHLORIDE 1 MG/ML
INJECTION INTRAMUSCULAR; INTRAVENOUS
Status: COMPLETED | OUTPATIENT
Start: 2024-07-23 | End: 2024-07-23

## 2024-07-23 ASSESSMENT — PAIN SCALES - GENERAL
PAINLEVEL_OUTOF10: 0 - NO PAIN

## 2024-07-23 ASSESSMENT — PAIN - FUNCTIONAL ASSESSMENT
PAIN_FUNCTIONAL_ASSESSMENT: 0-10

## 2024-07-23 NOTE — POST-PROCEDURE NOTE
Interventional Radiology Post-Procedure Note    Cecostomy Chait Tube Exchange      Indication for procedure: The encounter diagnosis was Cecostomy status (Multi).    Pre-Procedure Verification and Time Out:  · Procedure Location procedure area   · HUDDLE - Pre-procedure Verification completed   · TIME OUT - Final Verification completed immediately prior to procedure start   · DEBRIEF completed     General Information:  Date/Time of Procedure: 07/23/24 at 1:30 PM  Indication(s): neurogenic bowel  Findings: See PACS  Procedure performed by: Nestor Holland MD   Assistant(s): Dr. Joshua Trujillo MD  Estimated Blood Loss (mL): none  Specimen: No  Informed Consent: written consent obtained    Procedure Details:    Technically successful and uncomplicated Cecostomy chait tube exchange. Of note, patient brought own chait cecostomy tube. We have ordered a chait cecostomy tube for next exchange which will be performed in about a year.  Please see PACS for full procedural details.    Patient Tolerance: good  Complications: None    Prep:  Ultrasound Guided Insertion: No  Large Drape, Hand Hygiene, Surgical Cap, Surgical Mask, Sterile Gown, Sterile Gloves, Glasses, and Scrubs  Patient Position: Supine  Site Prep: chlorhexidine, draped, usual sterile procedure followed    Anesthesia/Medications:  Procedural Sedation:  Medications (Filter: Administrations occurring from 1237 to 1324 on 07/23/24) As of 07/23/24 1324      fentaNYL PF (Sublimaze) injection (mcg) Total dose:  100 mcg      Date/Time Rate/Dose/Volume Action       07/23/24  1255 50 mcg Given      1320 50 mcg Given               midazolam (Versed) injection (mg) Total dose:  2 mg      Date/Time Rate/Dose/Volume Action       07/23/24  1255 1 mg Given      1320 1 mg Given               iohexol (OMNIPaque) 350 mg iodine/mL solution 50 mL (mL) Total volume:  50 mL Dosing weight:  120      Date/Time Rate/Dose/Volume Action       07/23/24  1308 50 mL Given                    Fentanyl: 100 mcg  Versed: 2 mg    Attending Attestation:  I was present for the entire procedure    Nestor Holland MD, PGY-6  Interventional Radiology  IR pager: [phone]    NON-Urgent on call weekends and after hours weekdays (5pm - 5am) IR pager: [phone]  Urgent & emergent on call weekends and after hours weekdays (5pm-7am) IR pager: [phone]

## 2024-07-23 NOTE — PRE-PROCEDURE NOTE
INTERVENTIONAL RADIOLOGY PRE-PROCEDURE NOTE    John Gutierrez is a 29 y.o. male with PMHx of neurogenic bowel who presents to the interventional radiology department for cecostomy tube exchange.    Procedure: cecostomy tube exchange (chait catheter)    Indication for procedure: The encounter diagnosis was Cecostomy status (Multi).    History reviewed. No pertinent past medical history.   History reviewed. No pertinent surgical history.    Relevant Labs:   Lab Results   Component Value Date    CREATININE 0.79 07/02/2024    EGFR >90 07/02/2024    INR 1.1 07/02/2024    PROTIME 12.9 (H) 07/02/2024       Planned Sedation/Anesthesia: Moderate    Directed physical examination:    General: Normal appearance, behavior, cognition and NAD  Heart: Heart regular rate and rhythm  Lungs: No increased work of breathing  Abdomen: soft and nontender  Psych: oriented to time, place and person      Current Outpatient Medications:     diazePAM (Valium) 5 mg tablet, Take 1 tablet (5 mg) by mouth every 8 hours if needed for anxiety., Disp: , Rfl:     diphenhydrAMINE (Sominex) 25 mg tablet, Take 1 tablet (25 mg) by mouth if needed., Disp: , Rfl:     docusate sodium (Colace) 100 mg capsule, Take 1 capsule (100 mg) by mouth if needed for constipation., Disp: , Rfl:     lamoTRIgine (LaMICtal) 100 mg tablet, Take 0.5 tablets (50 mg) by mouth once daily., Disp: , Rfl:     lurasidone (Latuda) 40 mg tablet, Take 1 tablet (40 mg) by mouth once daily in the evening., Disp: , Rfl:     methylphenidate ER 36 mg extended release tablet, Take 2 tablets (72 mg) by mouth once daily in the morning., Disp: , Rfl:     polyethylene glycol (Glycolax, Miralax) 17 gram packet, Take 17 g by mouth if needed. Via cecostomy tube mixed in 1 cup of water followed by 1-2 cups of saline, Disp: , Rfl:     rizatriptan (Maxalt) 10 mg tablet, Take 1 tablet (10 mg) by mouth 1 time if needed for migraine. May repeat in 2 hours if unresolved. Do not exceed 30 mg in 24 hours.,  Disp: , Rfl:     sertraline (Zoloft) 100 mg tablet, Take 2 tablets (200 mg) by mouth early in the morning.., Disp: , Rfl:      Mallampati: II (hard and soft palate, upper portion of tonsils anduvula visible)    ASA Score: ASA 2 - Patient with mild systemic disease with no functional limitations    Benefits, risks and alternatives of procedure and planned sedation have been discussed with the patient and/or their representative. All questions answered and they agree to proceed.     Nestor Holland MD, PGY-6  Vascular & Interventional Radiology  IR pager: [phone]    NON-Urgent on call weekends and after hours weekdays (5pm - 5am) IR pager: [phone]  Urgent & emergent on call weekends and after hours weekdays (5pm-7am) IR pager: [phone]

## 2024-07-23 NOTE — DISCHARGE INSTRUCTIONS
Discharge information    Look for signs and symptoms of infection:  Including: redness, swelling, discharge such as pus, or odor from site.    Look for bleeding from site:  If bleeding occurs hold pressure for 5 minutes with paper towel, check site if still bleeding hold for 5 more minutes  If site continues to bleed after 10 minutes call 911.    You received moderate sedation:  - Do not drive a car, or operate any machinery or power tools of any kind.  - Do not drink any alcoholic drinks.  - Do not take any over the counter medications that may cause drowsiness.  - Do not make any important decisions or sign any legal documents.  - You need to have a responsible adult accompany you home.  - You may resume your normal diet.  - We strongly suggest that a responsible adult be with you for the rest of the day and also during the night. This is for your protection and safety.     For questions related to your procedure:  Please call [phone] between the hours of 7:00am-5:00pm Monday through Friday.  Please call [phone] after 5:00pm and on weekends and holidays.     In the event of an emergency call 911 or go to your nearest emergency room.

## 2024-07-26 LAB
BACTERIA BLD AEROBE CULT: ABNORMAL
BACTERIA BLD CULT: ABNORMAL
GRAM STN SPEC: ABNORMAL
LABORATORY COMMENT REPORT: ABNORMAL

## 2024-08-22 ENCOUNTER — APPOINTMENT (OUTPATIENT)
Dept: PRIMARY CARE | Facility: CLINIC | Age: 29
End: 2024-08-22
Payer: COMMERCIAL

## 2024-08-22 VITALS
WEIGHT: 254 LBS | BODY MASS INDEX: 36.36 KG/M2 | DIASTOLIC BLOOD PRESSURE: 71 MMHG | OXYGEN SATURATION: 96 % | HEIGHT: 70 IN | SYSTOLIC BLOOD PRESSURE: 106 MMHG | TEMPERATURE: 97.4 F | HEART RATE: 84 BPM

## 2024-08-22 DIAGNOSIS — R78.81 BACTEREMIA: ICD-10-CM

## 2024-08-22 DIAGNOSIS — Z93.3 CECOSTOMY STATUS (MULTI): Primary | ICD-10-CM

## 2024-08-22 DIAGNOSIS — N31.9 NEUROGENIC BLADDER: ICD-10-CM

## 2024-08-22 DIAGNOSIS — Z90.3 H/O GASTRIC SLEEVE: ICD-10-CM

## 2024-08-22 RX ORDER — OMEPRAZOLE 40 MG/1
CAPSULE, DELAYED RELEASE ORAL
COMMUNITY
Start: 2024-07-25

## 2024-08-22 RX ORDER — TIRZEPATIDE 2.5 MG/.5ML
INJECTION, SOLUTION SUBCUTANEOUS
COMMUNITY
Start: 2024-07-03

## 2024-08-22 RX ORDER — TIRZEPATIDE 5 MG/.5ML
INJECTION, SOLUTION SUBCUTANEOUS
COMMUNITY
Start: 2024-07-29

## 2024-08-22 ASSESSMENT — ENCOUNTER SYMPTOMS
NAUSEA: 0
DYSURIA: 0
FEVER: 0
CONSTIPATION: 0
VOMITING: 0
DIARRHEA: 0
ABDOMINAL PAIN: 0
LOSS OF SENSATION IN FEET: 0
DIZZINESS: 0
PALPITATIONS: 0
COUGH: 0
DEPRESSION: 1
OCCASIONAL FEELINGS OF UNSTEADINESS: 0
SHORTNESS OF BREATH: 0
CHILLS: 0
POLYPHAGIA: 0

## 2024-08-22 ASSESSMENT — PAIN SCALES - GENERAL: PAINLEVEL: 0-NO PAIN

## 2024-08-22 ASSESSMENT — PATIENT HEALTH QUESTIONNAIRE - PHQ9
2. FEELING DOWN, DEPRESSED OR HOPELESS: NOT AT ALL
SUM OF ALL RESPONSES TO PHQ9 QUESTIONS 1 AND 2: 0
1. LITTLE INTEREST OR PLEASURE IN DOING THINGS: NOT AT ALL

## 2024-08-22 NOTE — PROGRESS NOTES
Subjective   Patient ID: John Gutierrez is a 29 y.o. male who presents for Establish Care.    Mr. Gutierrez is a 29 year old male with PMHx of spina bifida myelomeningocele with repair,  shunt placement, tethered spinal cord syndrome, neurogenic bowel and bladder, pig tail cecostomy, and gastric sleeve resection who presents in clinic to establish care with a PCP in [place].    Patient does not present with any particular concerns. Patient states he has an extensive medical history.    PMHx:   -spina bifida myelomeningocele with surgical repair at day 3 of life  - shunt was then placed  -Tethered spinal cord syndrome with surgical repair at 4 years, which resulted in neurogenic bowel and bladder  -Anterograde enema and pig tail cecostomy surgery at 6 years old  -Catheterization at 6 years old  -history of recurrent infections at cecostomy site, this has led to complications in the past leading to need for cecostomy replacement  -history of recurrent UTIs at site of catheter  -developed ulcer at site of myelomeningocele repair at 15 years old, treated with debridement and advancement flaps to cover wound  -Underwent gastric sleeve resection in 2018, cecostomy also moved at that time  -Most recently developed infection at cecostomy site in early June that recurred in July in which he was hospitalized for salmonella bacteremia. Treated with antibiotics and cecostomy was replaced. Inflammation then recurred, which led to antibiotic treatment and wound care solution. Recommendation now is to get cecostomy regularly replaced every year.  -Reports having chronic conditions of depression, anxiety, OCD, ADHD, and acid reflux.     Meds: Takes lamotrigine, sertraline, lurasidone, concerta, omeprazole, zepbound (for gastric sleeve), diazepam for acute anxiety, triptan for migraines. Uses psychotherapy and medication to treat mental health disorders; says he feels these are well controlling his sx.    Allergies: latex  "precautions, sulfa antibiotics, possibly ciprofloxacin    Family hx: mckayla on mom's side passed from pancreatic cancer, mom has depression and anxiety, had gastric sleeve resection, dad has HTN, sister has depression and anxiety.     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 Patient says he is very close with family. Patient feels safe at home, lives alone with cat. Patient denies smoking, does endorse drinking alcohol once or twice a month, 1-3 drinks at those times. Denies illicit drug use. Denies recent travel outside country. Patient states he is up to date on vaccinations. Patient is sexually active with girlfriend, uses condoms.    Review of Systems   Constitutional:  Negative for chills and fever.   Respiratory:  Negative for cough and shortness of breath.    Cardiovascular:  Negative for chest pain, palpitations and leg swelling.   Gastrointestinal:  Negative for abdominal pain, constipation, diarrhea, nausea and vomiting.   Endocrine: Negative for polyphagia and polyuria.   Genitourinary:  Negative for dysuria.   Skin:  Negative for rash.   Neurological:  Negative for dizziness.       Objective   /71 (BP Location: Right arm, Patient Position: Sitting)   Pulse 84   Temp 36.3 °C (97.4 °F) (Temporal)   Ht 1.778 m (5' 10\")   Wt 115 kg (254 lb)   SpO2 96%   BMI 36.45 kg/m²     Physical Exam  Constitutional:       Appearance: Normal appearance.   HENT:      Head: Normocephalic and atraumatic.   Cardiovascular:      Rate and Rhythm: Normal rate and regular rhythm.      Heart sounds: Normal heart sounds.   Pulmonary:      Effort: Pulmonary effort is normal.      Breath sounds: Normal breath sounds.   Abdominal:      General: Abdomen is flat.      Palpations: Abdomen is soft.   Musculoskeletal:         General: No swelling.   Skin:     General: Skin is warm and dry.   Neurological:      Mental Status: He is alert.       Assessment/Plan "     Mr. Gutierrez is a 29 year old male with PMHx of spina bifida myelomeningocele with repair,  shunt placement, tethered spinal cord syndrome, neurogenic bowel and bladder, pig tail cecostomy, and gastric sleeve resection who presents in clinic to establish care with a PCP in [place].   Based on extensive surgical and  medical history, patient should be referred to see:  [ ] general surgery and   [ ] urology.   [ ] Lipid panel should be ordered as screening for this new patient.     Since pt was recently in hospital and had skewed CMP from marked hemolysis, a repeat CMP is ordered to check. Labs for serum copper, ionized calcium, and mag also ordered due to history of gastric sleeve resection.     [ ] Return in one year for annual.    Mariah Hernandez, MS3

## 2024-09-11 PROBLEM — Z00.00 ROUTINE GENERAL MEDICAL EXAMINATION AT A HEALTH CARE FACILITY: Status: ACTIVE | Noted: 2024-09-11

## 2025-08-12 RX ORDER — SERTRALINE HYDROCHLORIDE 100 MG/1
200 TABLET, FILM COATED ORAL
Qty: 180 TABLET | OUTPATIENT
Start: 2025-08-12